# Patient Record
Sex: MALE | NOT HISPANIC OR LATINO | Employment: OTHER | URBAN - METROPOLITAN AREA
[De-identification: names, ages, dates, MRNs, and addresses within clinical notes are randomized per-mention and may not be internally consistent; named-entity substitution may affect disease eponyms.]

---

## 2020-09-22 ENCOUNTER — APPOINTMENT (OUTPATIENT)
Dept: RADIOLOGY | Facility: MEDICAL CENTER | Age: 70
DRG: 054 | End: 2020-09-22
Attending: INTERNAL MEDICINE
Payer: MEDICARE

## 2020-09-22 ENCOUNTER — HOSPITAL ENCOUNTER (INPATIENT)
Facility: MEDICAL CENTER | Age: 70
LOS: 3 days | DRG: 054 | End: 2020-09-25
Attending: INTERNAL MEDICINE | Admitting: INTERNAL MEDICINE
Payer: MEDICARE

## 2020-09-22 PROBLEM — R73.9 HYPERGLYCEMIA: Status: ACTIVE | Noted: 2020-09-22

## 2020-09-22 PROBLEM — C79.31 BRAIN METASTASES: Status: ACTIVE | Noted: 2020-09-22

## 2020-09-22 PROBLEM — E87.1 HYPONATREMIA: Status: ACTIVE | Noted: 2020-09-22

## 2020-09-22 PROBLEM — F10.10 ALCOHOL ABUSE: Status: ACTIVE | Noted: 2020-09-22

## 2020-09-22 PROBLEM — D72.829 LEUKOCYTOSIS: Status: ACTIVE | Noted: 2020-09-22

## 2020-09-22 PROBLEM — Z72.0 TOBACCO ABUSE: Status: ACTIVE | Noted: 2020-09-22

## 2020-09-22 LAB — COVID ORDER STATUS COVID19: NORMAL

## 2020-09-22 PROCEDURE — A9270 NON-COVERED ITEM OR SERVICE: HCPCS | Performed by: INTERNAL MEDICINE

## 2020-09-22 PROCEDURE — 700102 HCHG RX REV CODE 250 W/ 637 OVERRIDE(OP): Performed by: NEUROLOGICAL SURGERY

## 2020-09-22 PROCEDURE — 770006 HCHG ROOM/CARE - MED/SURG/GYN SEMI*

## 2020-09-22 PROCEDURE — 99407 BEHAV CHNG SMOKING > 10 MIN: CPT | Performed by: INTERNAL MEDICINE

## 2020-09-22 PROCEDURE — C9803 HOPD COVID-19 SPEC COLLECT: HCPCS | Performed by: INTERNAL MEDICINE

## 2020-09-22 PROCEDURE — U0003 INFECTIOUS AGENT DETECTION BY NUCLEIC ACID (DNA OR RNA); SEVERE ACUTE RESPIRATORY SYNDROME CORONAVIRUS 2 (SARS-COV-2) (CORONAVIRUS DISEASE [COVID-19]), AMPLIFIED PROBE TECHNIQUE, MAKING USE OF HIGH THROUGHPUT TECHNOLOGIES AS DESCRIBED BY CMS-2020-01-R: HCPCS

## 2020-09-22 PROCEDURE — A9270 NON-COVERED ITEM OR SERVICE: HCPCS | Performed by: NEUROLOGICAL SURGERY

## 2020-09-22 PROCEDURE — 700105 HCHG RX REV CODE 258: Performed by: INTERNAL MEDICINE

## 2020-09-22 PROCEDURE — 700102 HCHG RX REV CODE 250 W/ 637 OVERRIDE(OP): Performed by: INTERNAL MEDICINE

## 2020-09-22 PROCEDURE — 99223 1ST HOSP IP/OBS HIGH 75: CPT | Mod: 25 | Performed by: INTERNAL MEDICINE

## 2020-09-22 RX ORDER — OXYCODONE HYDROCHLORIDE 5 MG/1
10 TABLET ORAL
COMMUNITY

## 2020-09-22 RX ORDER — BISACODYL 10 MG
10 SUPPOSITORY, RECTAL RECTAL
Status: DISCONTINUED | OUTPATIENT
Start: 2020-09-22 | End: 2020-09-25 | Stop reason: HOSPADM

## 2020-09-22 RX ORDER — LISINOPRIL 10 MG/1
10 TABLET ORAL 2 TIMES DAILY
COMMUNITY

## 2020-09-22 RX ORDER — LEVETIRACETAM 500 MG/1
500 TABLET ORAL 2 TIMES DAILY
Status: DISCONTINUED | OUTPATIENT
Start: 2020-09-22 | End: 2020-09-25 | Stop reason: HOSPADM

## 2020-09-22 RX ORDER — ACETAMINOPHEN 325 MG/1
650 TABLET ORAL EVERY 6 HOURS PRN
Status: DISCONTINUED | OUTPATIENT
Start: 2020-09-22 | End: 2020-09-25 | Stop reason: HOSPADM

## 2020-09-22 RX ORDER — ONDANSETRON 4 MG/1
4 TABLET, ORALLY DISINTEGRATING ORAL EVERY 4 HOURS PRN
Status: DISCONTINUED | OUTPATIENT
Start: 2020-09-22 | End: 2020-09-25 | Stop reason: HOSPADM

## 2020-09-22 RX ORDER — SODIUM CHLORIDE 9 MG/ML
INJECTION, SOLUTION INTRAVENOUS CONTINUOUS
Status: DISCONTINUED | OUTPATIENT
Start: 2020-09-22 | End: 2020-09-25

## 2020-09-22 RX ORDER — OXYCODONE HYDROCHLORIDE 10 MG/1
10 TABLET ORAL
Status: DISCONTINUED | OUTPATIENT
Start: 2020-09-22 | End: 2020-09-25 | Stop reason: HOSPADM

## 2020-09-22 RX ORDER — DEXAMETHASONE 4 MG/1
4 TABLET ORAL EVERY 8 HOURS
Status: DISCONTINUED | OUTPATIENT
Start: 2020-09-22 | End: 2020-09-25 | Stop reason: HOSPADM

## 2020-09-22 RX ORDER — POLYETHYLENE GLYCOL 3350 17 G/17G
1 POWDER, FOR SOLUTION ORAL
Status: DISCONTINUED | OUTPATIENT
Start: 2020-09-22 | End: 2020-09-25 | Stop reason: HOSPADM

## 2020-09-22 RX ORDER — GABAPENTIN 300 MG/1
300 CAPSULE ORAL 4 TIMES DAILY
COMMUNITY

## 2020-09-22 RX ORDER — GABAPENTIN 300 MG/1
300 CAPSULE ORAL 4 TIMES DAILY
Status: DISCONTINUED | OUTPATIENT
Start: 2020-09-22 | End: 2020-09-25 | Stop reason: HOSPADM

## 2020-09-22 RX ORDER — ONDANSETRON 2 MG/ML
4 INJECTION INTRAMUSCULAR; INTRAVENOUS EVERY 4 HOURS PRN
Status: DISCONTINUED | OUTPATIENT
Start: 2020-09-22 | End: 2020-09-25 | Stop reason: HOSPADM

## 2020-09-22 RX ORDER — AMOXICILLIN 250 MG
2 CAPSULE ORAL 2 TIMES DAILY
Status: DISCONTINUED | OUTPATIENT
Start: 2020-09-22 | End: 2020-09-25 | Stop reason: HOSPADM

## 2020-09-22 RX ORDER — ENALAPRILAT 1.25 MG/ML
1.25 INJECTION INTRAVENOUS EVERY 6 HOURS PRN
Status: DISCONTINUED | OUTPATIENT
Start: 2020-09-22 | End: 2020-09-25 | Stop reason: HOSPADM

## 2020-09-22 RX ORDER — LISINOPRIL 10 MG/1
10 TABLET ORAL 2 TIMES DAILY
Status: DISCONTINUED | OUTPATIENT
Start: 2020-09-22 | End: 2020-09-25 | Stop reason: HOSPADM

## 2020-09-22 RX ADMIN — OXYCODONE HYDROCHLORIDE 10 MG: 10 TABLET ORAL at 21:11

## 2020-09-22 RX ADMIN — DEXAMETHASONE 4 MG: 4 TABLET ORAL at 22:18

## 2020-09-22 RX ADMIN — LEVETIRACETAM 500 MG: 500 TABLET ORAL at 21:11

## 2020-09-22 RX ADMIN — DOCUSATE SODIUM 50 MG AND SENNOSIDES 8.6 MG 2 TABLET: 8.6; 5 TABLET, FILM COATED ORAL at 21:12

## 2020-09-22 RX ADMIN — GABAPENTIN 300 MG: 300 CAPSULE ORAL at 21:12

## 2020-09-22 RX ADMIN — ACETAMINOPHEN 650 MG: 325 TABLET, FILM COATED ORAL at 21:11

## 2020-09-22 RX ADMIN — SODIUM CHLORIDE: 9 INJECTION, SOLUTION INTRAVENOUS at 21:14

## 2020-09-22 RX ADMIN — LISINOPRIL 10 MG: 10 TABLET ORAL at 21:11

## 2020-09-22 SDOH — HEALTH STABILITY: MENTAL HEALTH: HOW OFTEN DO YOU HAVE 6 OR MORE DRINKS ON ONE OCCASION?: LESS THAN MONTHLY

## 2020-09-22 SDOH — HEALTH STABILITY: MENTAL HEALTH: HOW MANY STANDARD DRINKS CONTAINING ALCOHOL DO YOU HAVE ON A TYPICAL DAY?: 3 OR 4

## 2020-09-22 SDOH — HEALTH STABILITY: MENTAL HEALTH: HOW OFTEN DO YOU HAVE A DRINK CONTAINING ALCOHOL?: 4 OR MORE TIMES A WEEK

## 2020-09-22 SDOH — ECONOMIC STABILITY: TRANSPORTATION INSECURITY
IN THE PAST 12 MONTHS, HAS THE LACK OF TRANSPORTATION KEPT YOU FROM MEDICAL APPOINTMENTS OR FROM GETTING MEDICATIONS?: PATIENT DECLINED

## 2020-09-22 SDOH — ECONOMIC STABILITY: FOOD INSECURITY: WITHIN THE PAST 12 MONTHS, THE FOOD YOU BOUGHT JUST DIDN'T LAST AND YOU DIDN'T HAVE MONEY TO GET MORE.: PATIENT DECLINED

## 2020-09-22 SDOH — ECONOMIC STABILITY: FOOD INSECURITY: WITHIN THE PAST 12 MONTHS, YOU WORRIED THAT YOUR FOOD WOULD RUN OUT BEFORE YOU GOT MONEY TO BUY MORE.: PATIENT DECLINED

## 2020-09-22 SDOH — ECONOMIC STABILITY: TRANSPORTATION INSECURITY
IN THE PAST 12 MONTHS, HAS LACK OF TRANSPORTATION KEPT YOU FROM MEETINGS, WORK, OR FROM GETTING THINGS NEEDED FOR DAILY LIVING?: PATIENT DECLINED

## 2020-09-22 ASSESSMENT — ENCOUNTER SYMPTOMS
SPUTUM PRODUCTION: 0
HEADACHES: 0
SPEECH CHANGE: 0
CHILLS: 0
BACK PAIN: 1
VOMITING: 0
SHORTNESS OF BREATH: 0
DEPRESSION: 0
LOSS OF CONSCIOUSNESS: 0
STRIDOR: 0
DIZZINESS: 0
ABDOMINAL PAIN: 0
TINGLING: 0
MYALGIAS: 0
FOCAL WEAKNESS: 1
NAUSEA: 0
PALPITATIONS: 0
FALLS: 0
CONSTIPATION: 0
MEMORY LOSS: 1
DIARRHEA: 0
SENSORY CHANGE: 0
COUGH: 0
WEAKNESS: 1
FEVER: 0

## 2020-09-22 ASSESSMENT — LIFESTYLE VARIABLES
HOW MANY TIMES IN THE PAST YEAR HAVE YOU HAD 5 OR MORE DRINKS IN A DAY: 0
TOTAL SCORE: 0
EVER HAD A DRINK FIRST THING IN THE MORNING TO STEADY YOUR NERVES TO GET RID OF A HANGOVER: NO
ON A TYPICAL DAY WHEN YOU DRINK ALCOHOL HOW MANY DRINKS DO YOU HAVE: 3
HAVE YOU EVER FELT YOU SHOULD CUT DOWN ON YOUR DRINKING: NO
CONSUMPTION TOTAL: POSITIVE
HAVE PEOPLE ANNOYED YOU BY CRITICIZING YOUR DRINKING: NO
AVERAGE NUMBER OF DAYS PER WEEK YOU HAVE A DRINK CONTAINING ALCOHOL: 5
EVER FELT BAD OR GUILTY ABOUT YOUR DRINKING: NO
TOTAL SCORE: 0
ALCOHOL_USE: YES
TOTAL SCORE: 0

## 2020-09-22 ASSESSMENT — COGNITIVE AND FUNCTIONAL STATUS - GENERAL
MOBILITY SCORE: 18
TURNING FROM BACK TO SIDE WHILE IN FLAT BAD: A LITTLE
TOILETING: A LITTLE
PERSONAL GROOMING: A LITTLE
SUGGESTED CMS G CODE MODIFIER DAILY ACTIVITY: CK
MOVING FROM LYING ON BACK TO SITTING ON SIDE OF FLAT BED: A LITTLE
CLIMB 3 TO 5 STEPS WITH RAILING: A LITTLE
SUGGESTED CMS G CODE MODIFIER MOBILITY: CK
HELP NEEDED FOR BATHING: A LITTLE
DRESSING REGULAR UPPER BODY CLOTHING: A LITTLE
MOVING TO AND FROM BED TO CHAIR: A LITTLE
DRESSING REGULAR LOWER BODY CLOTHING: A LITTLE
WALKING IN HOSPITAL ROOM: A LITTLE
STANDING UP FROM CHAIR USING ARMS: A LITTLE
EATING MEALS: A LITTLE
DAILY ACTIVITIY SCORE: 18

## 2020-09-22 ASSESSMENT — PATIENT HEALTH QUESTIONNAIRE - PHQ9
SUM OF ALL RESPONSES TO PHQ9 QUESTIONS 1 AND 2: 0
2. FEELING DOWN, DEPRESSED, IRRITABLE, OR HOPELESS: NOT AT ALL
1. LITTLE INTEREST OR PLEASURE IN DOING THINGS: NOT AT ALL

## 2020-09-22 ASSESSMENT — PAIN DESCRIPTION - PAIN TYPE: TYPE: ACUTE PAIN

## 2020-09-22 NOTE — PROGRESS NOTES
TRIAGE OFFICER DIRECT ADMIT ACCEPTANCE NOTE:    -I spoke with the transferring provider, Dr. Hatch of [facility].  -This is a 70 y.o. male with no past medical history except for tobacco dependence, who presented to the outlying facility with falls, confusion, and right-sided dysmetria.  Head imaging showed 3 large brain masses with edema, suspicious for brain mets.  No other imaging were done, but  film for the CT did suggest probable lung primary.  Patient was given 10 mg of IV Decadron.  Neurosurgery (Dr. Mariano) has been consulted, and has agreed to consult.  -Please call neurosurgery (Dr. Mariano) to inform them of patient's arrival.  -Please call admitting hospitalist ON-CALL for full H&P and admission orders, on patient's arrival to the unit.

## 2020-09-23 ENCOUNTER — APPOINTMENT (OUTPATIENT)
Dept: RADIOLOGY | Facility: MEDICAL CENTER | Age: 70
DRG: 054 | End: 2020-09-23
Attending: HOSPITALIST
Payer: MEDICARE

## 2020-09-23 ENCOUNTER — APPOINTMENT (OUTPATIENT)
Dept: RADIOLOGY | Facility: MEDICAL CENTER | Age: 70
DRG: 054 | End: 2020-09-23
Attending: INTERNAL MEDICINE
Payer: MEDICARE

## 2020-09-23 PROBLEM — R91.8 MASS OF LEFT LUNG: Status: ACTIVE | Noted: 2020-09-23

## 2020-09-23 LAB
ALBUMIN SERPL BCP-MCNC: 4 G/DL (ref 3.2–4.9)
ALBUMIN/GLOB SERPL: 1.6 G/DL
ALP SERPL-CCNC: 61 U/L (ref 30–99)
ALT SERPL-CCNC: 14 U/L (ref 2–50)
ANION GAP SERPL CALC-SCNC: 15 MMOL/L (ref 7–16)
ANION GAP SERPL CALC-SCNC: 16 MMOL/L (ref 7–16)
AST SERPL-CCNC: 13 U/L (ref 12–45)
BILIRUB SERPL-MCNC: 0.4 MG/DL (ref 0.1–1.5)
BUN SERPL-MCNC: 11 MG/DL (ref 8–22)
BUN SERPL-MCNC: 12 MG/DL (ref 8–22)
CALCIUM SERPL-MCNC: 8.6 MG/DL (ref 8.5–10.5)
CALCIUM SERPL-MCNC: 8.7 MG/DL (ref 8.5–10.5)
CHLORIDE SERPL-SCNC: 96 MMOL/L (ref 96–112)
CHLORIDE SERPL-SCNC: 97 MMOL/L (ref 96–112)
CO2 SERPL-SCNC: 20 MMOL/L (ref 20–33)
CO2 SERPL-SCNC: 20 MMOL/L (ref 20–33)
CREAT SERPL-MCNC: 0.39 MG/DL (ref 0.5–1.4)
CREAT SERPL-MCNC: 0.49 MG/DL (ref 0.5–1.4)
ERYTHROCYTE [DISTWIDTH] IN BLOOD BY AUTOMATED COUNT: 42.7 FL (ref 35.9–50)
GLOBULIN SER CALC-MCNC: 2.5 G/DL (ref 1.9–3.5)
GLUCOSE SERPL-MCNC: 122 MG/DL (ref 65–99)
GLUCOSE SERPL-MCNC: 223 MG/DL (ref 65–99)
HCT VFR BLD AUTO: 47.3 % (ref 42–52)
HGB BLD-MCNC: 16.5 G/DL (ref 14–18)
MCH RBC QN AUTO: 33.6 PG (ref 27–33)
MCHC RBC AUTO-ENTMCNC: 34.9 G/DL (ref 33.7–35.3)
MCV RBC AUTO: 96.3 FL (ref 81.4–97.8)
PLATELET # BLD AUTO: 273 K/UL (ref 164–446)
PMV BLD AUTO: 9.8 FL (ref 9–12.9)
POTASSIUM SERPL-SCNC: 4.2 MMOL/L (ref 3.6–5.5)
POTASSIUM SERPL-SCNC: 4.2 MMOL/L (ref 3.6–5.5)
PROT SERPL-MCNC: 6.5 G/DL (ref 6–8.2)
RBC # BLD AUTO: 4.91 M/UL (ref 4.7–6.1)
SARS-COV-2 RNA RESP QL NAA+PROBE: NOTDETECTED
SODIUM SERPL-SCNC: 131 MMOL/L (ref 135–145)
SODIUM SERPL-SCNC: 133 MMOL/L (ref 135–145)
SPECIMEN SOURCE: NORMAL
WBC # BLD AUTO: 13.4 K/UL (ref 4.8–10.8)

## 2020-09-23 PROCEDURE — 700102 HCHG RX REV CODE 250 W/ 637 OVERRIDE(OP): Performed by: NEUROLOGICAL SURGERY

## 2020-09-23 PROCEDURE — 36415 COLL VENOUS BLD VENIPUNCTURE: CPT

## 2020-09-23 PROCEDURE — A9270 NON-COVERED ITEM OR SERVICE: HCPCS | Performed by: NEUROLOGICAL SURGERY

## 2020-09-23 PROCEDURE — 700102 HCHG RX REV CODE 250 W/ 637 OVERRIDE(OP): Performed by: INTERNAL MEDICINE

## 2020-09-23 PROCEDURE — 80053 COMPREHEN METABOLIC PANEL: CPT

## 2020-09-23 PROCEDURE — A9270 NON-COVERED ITEM OR SERVICE: HCPCS | Performed by: INTERNAL MEDICINE

## 2020-09-23 PROCEDURE — 80048 BASIC METABOLIC PNL TOTAL CA: CPT

## 2020-09-23 PROCEDURE — 74177 CT ABD & PELVIS W/CONTRAST: CPT

## 2020-09-23 PROCEDURE — 74019 RADEX ABDOMEN 2 VIEWS: CPT

## 2020-09-23 PROCEDURE — 99233 SBSQ HOSP IP/OBS HIGH 50: CPT | Performed by: HOSPITALIST

## 2020-09-23 PROCEDURE — 770006 HCHG ROOM/CARE - MED/SURG/GYN SEMI*

## 2020-09-23 PROCEDURE — 700117 HCHG RX CONTRAST REV CODE 255: Performed by: INTERNAL MEDICINE

## 2020-09-23 PROCEDURE — 85027 COMPLETE CBC AUTOMATED: CPT

## 2020-09-23 PROCEDURE — 700105 HCHG RX REV CODE 258: Performed by: INTERNAL MEDICINE

## 2020-09-23 RX ORDER — LORAZEPAM 2 MG/ML
0.5 INJECTION INTRAMUSCULAR EVERY 4 HOURS PRN
Status: DISCONTINUED | OUTPATIENT
Start: 2020-09-23 | End: 2020-09-25 | Stop reason: HOSPADM

## 2020-09-23 RX ADMIN — OXYCODONE HYDROCHLORIDE 10 MG: 10 TABLET ORAL at 05:36

## 2020-09-23 RX ADMIN — LISINOPRIL 10 MG: 10 TABLET ORAL at 05:35

## 2020-09-23 RX ADMIN — DEXAMETHASONE 4 MG: 4 TABLET ORAL at 05:36

## 2020-09-23 RX ADMIN — DEXAMETHASONE 4 MG: 4 TABLET ORAL at 21:39

## 2020-09-23 RX ADMIN — LISINOPRIL 10 MG: 10 TABLET ORAL at 17:23

## 2020-09-23 RX ADMIN — GABAPENTIN 300 MG: 300 CAPSULE ORAL at 21:39

## 2020-09-23 RX ADMIN — GABAPENTIN 300 MG: 300 CAPSULE ORAL at 08:08

## 2020-09-23 RX ADMIN — SODIUM CHLORIDE: 9 INJECTION, SOLUTION INTRAVENOUS at 21:40

## 2020-09-23 RX ADMIN — LEVETIRACETAM 500 MG: 500 TABLET ORAL at 05:35

## 2020-09-23 RX ADMIN — ACETAMINOPHEN 650 MG: 325 TABLET, FILM COATED ORAL at 05:36

## 2020-09-23 RX ADMIN — OXYCODONE HYDROCHLORIDE 10 MG: 10 TABLET ORAL at 21:39

## 2020-09-23 RX ADMIN — DOCUSATE SODIUM 50 MG AND SENNOSIDES 8.6 MG 2 TABLET: 8.6; 5 TABLET, FILM COATED ORAL at 17:23

## 2020-09-23 RX ADMIN — GABAPENTIN 300 MG: 300 CAPSULE ORAL at 13:35

## 2020-09-23 RX ADMIN — GABAPENTIN 300 MG: 300 CAPSULE ORAL at 17:23

## 2020-09-23 RX ADMIN — LEVETIRACETAM 500 MG: 500 TABLET ORAL at 17:23

## 2020-09-23 RX ADMIN — IOHEXOL 100 ML: 350 INJECTION, SOLUTION INTRAVENOUS at 12:30

## 2020-09-23 RX ADMIN — DOCUSATE SODIUM 50 MG AND SENNOSIDES 8.6 MG 2 TABLET: 8.6; 5 TABLET, FILM COATED ORAL at 05:36

## 2020-09-23 RX ADMIN — DEXAMETHASONE 4 MG: 4 TABLET ORAL at 13:50

## 2020-09-23 ASSESSMENT — ENCOUNTER SYMPTOMS
SPEECH CHANGE: 0
DIARRHEA: 0
WEAKNESS: 1
FOCAL WEAKNESS: 1
ABDOMINAL PAIN: 0
HEADACHES: 0
DEPRESSION: 0
DIZZINESS: 0
NAUSEA: 0
HEMOPTYSIS: 0
PALPITATIONS: 0
SPUTUM PRODUCTION: 0
BRUISES/BLEEDS EASILY: 0
WHEEZING: 0
SENSORY CHANGE: 0
DIAPHORESIS: 0
COUGH: 0
EYE DISCHARGE: 0
NECK PAIN: 0
EYE PAIN: 0
CHILLS: 0
BACK PAIN: 0
VOMITING: 0
FEVER: 0
CLAUDICATION: 0
LOSS OF CONSCIOUSNESS: 0
CONSTIPATION: 0
SHORTNESS OF BREATH: 0
MYALGIAS: 0
SORE THROAT: 0

## 2020-09-23 ASSESSMENT — PAIN DESCRIPTION - PAIN TYPE
TYPE: ACUTE PAIN
TYPE: CHRONIC PAIN

## 2020-09-23 ASSESSMENT — LIFESTYLE VARIABLES: SUBSTANCE_ABUSE: 0

## 2020-09-23 NOTE — ASSESSMENT & PLAN NOTE
-Trending down, now near normal.  No need for coverage at this time.  We will continue to monitor.

## 2020-09-23 NOTE — ASSESSMENT & PLAN NOTE
-Noted on imaging from outside facility.  Likely metastatic disease.  -Continue Decadron and Keppra per neurosurgery, who is following.  -Pending stealth MRI.  - CT chest/abdomen/pelvis with contrast with 5.5 cm JIMMIE lung mass, likely primary.  Lung biopsy pending.

## 2020-09-23 NOTE — PROGRESS NOTES
0700: Assumed care of pt after report. Pt resting in bed, no concerns voiced. Call light with in reach.

## 2020-09-23 NOTE — PROGRESS NOTES
1900: Patient arrived to unit escorted by EMS via gurney without incident. Patient able to ambulate to bed with stand by assist upon arrival. Patient oriented to room and how to use call light to alert staff of needs. Admitting notified of patient's arrival.      1930: Patient armband placed and consent to treat signed. Dr. Clifton at bedside to see patient.

## 2020-09-23 NOTE — CONSULTS
DATE OF SERVICE:  09/23/2020    NEUROSURGERY CONSULTATION     REASON FOR CONSULTATION:  Multifocal brain tumors per report.      HISTORY OF PRESENT ILLNESS:  This is a 70-year-old gentleman with a history of   1.5 pack smoking per day for the last 40 years, who presented with right   lower extremity weakness for concern for stroke.  He subsequently had a brain   MRI at [facility], which demonstrated per the ED report,   multifocal brain tumors.  Unfortunately, that images were not pushed by the   time the consultation was performed nor that the patient have any imaging.    The patient's symptoms have significantly resolved by this morning on Decadron   and Keppra.  It is unclear whether or not this is going to be secondary to   improvement in vasogenic edema or if the patient actually had a seizure and   had a Min's paralysis ____ continued to improve.  He still has a foot drop   this morning on the right side and the patient appears to be otherwise   neurologically intact this morning.      REVIEW OF SYSTEMS:  A 12-point review of systems as per HPI.  He denies any   chest pain, shortness of breath, bowel or bladder incontinence.  He does not   have any loss of consciousness.      PAST MEDICAL HISTORY:  Noncontributory.      PAST SURGICAL HISTORY:  Noncontributory.      MEDICATIONS:  He is not on any blood thinners per report.      PHYSICAL EXAMINATION:    GENERAL:  He is alert and oriented x3, able to answer questions appropriately.    No signs of dysarthria or aphasia.    HEENT:  Atraumatic, normocephalic.    PULMONARY:  Normal work of breathing.    CARDIOVASCULAR:  2+ pulses.    NEUROLOGIC:  Cranial nerves grossly intact.  Motor examination is nonfocal   with exception of a right lower extremity EHL weakness at 3/5.  He does not   have any paresthesias.      IMAGING:  None available, but per report, the patient has multifocal brain   lesions and his concern, although the patient did not have a  chest, abdomen,   and pelvis for potential lung cancer  per report based on social history.      ASSESSMENT AND PLAN:  At this time, the patient has a report of multifocal   brain lesions; however, we do not have any imaging.  We have ordered an MRI   brain with and without contrast with Stealth protocol.  The hospitalist has   ordered a chest, abdomen, and pelvis, which is supposed to be completed as of   10 a.m. this morning for surveillance and we have also recommended Keppra 500   b.i.d. and dexamethasone 4 mg oral q. 8 hours for now and we will await the   final imaging prior to leaving recommendations.  If a lesion is found on the   chest, abdomen and pelvis, we would endorse getting a biopsy of this for   diagnoses and pending what the MRI brain shows the patient may or may not need   a surgical intervention depending if there is a dominant lesion causing   symptoms versus potential benefits from whole brain radiation if there is   significant numbers of intracranial mets.  We will continue to add information   as the results come forward and we will continue to follow while patient is   continued to have workup.      A total of 50 minutes was spent in direct patient care, coordination and   consultation.       ____________________________________     MD MIO Miller / MARVA    DD:  09/23/2020 09:23:02  DT:  09/23/2020 12:03:57    D#:  [id number]  Job#:  [id number]

## 2020-09-23 NOTE — H&P
Hospital Medicine History & Physical Note    Date of Service  9/22/2020    Primary Care Physician  None     Consultants  Neurosurgery - Dr Mariano    Code Status  Full Code    Chief Complaint  Right leg weakness      History of Presenting Illness  70 y.o. male who presented 9/22/2020 with leg weakness, memory issues and confusion. He states his symptoms started about 1 week ago and have progressed. He denies falls but states he has been very off balance. He states both legs are weak, right > left. He states a couple nights ago he was unable to figure out how to get a wrench onto a bolt. He can't come up with the word he wants occasionally during the exam. He initially presented to [facility] where they did a CT head and noted multiple metastatic lesions, transferred here for higher level of care. I have discussed the case with the neurosurgeon. He also complains of chronic lower back pain for which he has a stimulator.    Review of Systems  Review of Systems   Constitutional: Negative for chills, fever and malaise/fatigue.   HENT: Negative for congestion.    Respiratory: Negative for cough, sputum production, shortness of breath and stridor.    Cardiovascular: Negative for chest pain, palpitations and leg swelling.   Gastrointestinal: Negative for abdominal pain, constipation, diarrhea, nausea and vomiting.   Genitourinary: Negative for dysuria and urgency.   Musculoskeletal: Positive for back pain. Negative for falls and myalgias.   Neurological: Positive for focal weakness and weakness. Negative for dizziness, tingling, sensory change, speech change, loss of consciousness and headaches.   Psychiatric/Behavioral: Positive for memory loss (and confusion ). Negative for depression and suicidal ideas.   All other systems reviewed and are negative.      Past Medical History  Chronic pain, hypertension    Surgical History   spinal stimulator     Family History  Reviewed, non pertinent      Social History   reports that  he has been smoking. He has been smoking about 1.50 packs per day. He has never used smokeless tobacco. He reports current alcohol use. He reports that he does not use drugs.    Allergies  No Known Allergies    Medications  Prior to Admission Medications   Prescriptions Last Dose Informant Patient Reported? Taking?   gabapentin (NEURONTIN) 300 MG Cap 9/22/2020 at 0800  Yes Yes   Sig: Take 300 mg by mouth 4 times a day.   lisinopril (PRINIVIL) 10 MG Tab 9/22/2020 at 0800  Yes Yes   Sig: Take 10 mg by mouth 2 times a day.   oxyCODONE immediate-release (ROXICODONE) 5 MG Tab 9/22/2020 at 0800  Yes Yes   Sig: Take 10 mg by mouth every 3 hours as needed for Severe Pain.      Facility-Administered Medications: None       Physical Exam       Physical Exam  Vitals signs and nursing note reviewed.   Constitutional:       General: He is not in acute distress.     Appearance: He is well-developed. He is not toxic-appearing or diaphoretic.   HENT:      Head: Normocephalic and atraumatic.      Right Ear: External ear normal.      Left Ear: External ear normal.      Nose: Nose normal. No congestion or rhinorrhea.      Mouth/Throat:      Mouth: Mucous membranes are dry.      Pharynx: No oropharyngeal exudate.   Eyes:      General:         Right eye: No discharge.         Left eye: No discharge.      Extraocular Movements: Extraocular movements intact.   Neck:      Musculoskeletal: Normal range of motion and neck supple. No edema or erythema.      Trachea: No tracheal deviation.   Cardiovascular:      Rate and Rhythm: Normal rate and regular rhythm.      Heart sounds: No murmur. No friction rub. No gallop.    Pulmonary:      Effort: Pulmonary effort is normal. No respiratory distress.      Breath sounds: Normal breath sounds. No stridor. No wheezing or rales.   Chest:      Chest wall: No tenderness.   Abdominal:      General: Bowel sounds are normal. There is no distension.      Palpations: Abdomen is soft.      Tenderness: There is  no abdominal tenderness.   Musculoskeletal:      Right lower leg: No edema.      Left lower leg: No edema.   Lymphadenopathy:      Cervical: No cervical adenopathy.   Skin:     General: Skin is warm and dry.      Coloration: Skin is not jaundiced.      Findings: No erythema or rash.   Neurological:      Mental Status: He is alert and oriented to person, place, and time.      Cranial Nerves: No cranial nerve deficit.      Motor: Weakness (right lower extremity ) present.      Comments: Some confusion and word finding difficulty    Psychiatric:         Behavior: Behavior normal.         Thought Content: Thought content normal.         Cognition and Memory: Cognition is impaired. Memory is impaired.         Judgment: Judgment normal.         Laboratory:    labs from outside facility: wbc 14, hgb 16.7, hct 48.2, platelets 246, Na 133, k 3.9, chloride 102, CO2 22, BUN 9, creatinine 0.77, glucose 123      No results for input(s): ALTSGPT, ASTSGOT, ALKPHOSPHAT, TBILIRUBIN, DBILIRUBIN, GAMMAGT, AMYLASE, LIPASE, ALB, PREALBUMIN, GLUCOSE in the last 72 hours.      No results for input(s): NTPROBNP in the last 72 hours.      No results for input(s): TROPONINT in the last 72 hours.    Imaging:  MR-BRAIN-WITH & W/O    (Results Pending)   MR-STEALTH BRAIN WITH & W/O    (Results Pending)   CT-CHEST,ABDOMEN,PELVIS WITH    (Results Pending)   outside facility CT head: wide areas of vasogenic edema within the posterior cerebral hemispheres more pronounced on the left related to round intra-axial masses suspicious for metastatic disease, 1 within the left paramedian anterior parietal lobe, another within the right lateral occipital pole, 3rd smaller lesion near the vertex within the left paramedian mid frontal lobe.      Assessment/Plan:  I anticipate this patient will require at least two midnights for appropriate medical management, necessitating inpatient admission.    * Brain metastases (HCC)- (present on admission)  Assessment &  Plan  -noted on CT head from outside facility  -likely metastatic disease  -I did discuss the case with Dr Mariano who is ordering MRI brain, dexamethasone and keppra  -I am ordering CT chest/abdomen/pelvis with contrast to determine primary    Hyponatremia- (present on admission)  Assessment & Plan  -mild, due to dehydration  -start IVF  -repeat bmp in am    Hyperglycemia- (present on admission)  Assessment & Plan  -mild, no need for coverage    Leukocytosis- (present on admission)  Assessment & Plan  -likely reactive  -no antibiotics needed  -repeat cbc in am    Alcohol abuse- (present on admission)  Assessment & Plan  -3 glasses of wine per day  -he denies history of withdrawal  -monitor for withdrawal, CIWA if needed    Tobacco abuse- (present on admission)  Assessment & Plan  -Tobacco cessation counseling and education provided for more than 11 minutes. Nicotine replacement options provided including patch, and further medical treatments including Wellbutrin and chantix.  As well as over the counter options of lozenges and gum  -he denied the need for a patch

## 2020-09-23 NOTE — CARE PLAN
Problem: Safety  Goal: Will remain free from injury  Outcome: PROGRESSING AS EXPECTED     Problem: Infection  Goal: Will remain free from infection  Outcome: PROGRESSING AS EXPECTED     Problem: Knowledge Deficit  Goal: Knowledge of disease process/condition, treatment plan, diagnostic tests, and medications will improve  Outcome: PROGRESSING AS EXPECTED  Intervention: Assess knowledge level of disease process/condition, treatment plan, diagnostic tests, and medications  Note: Assess knowledge and educate pt on all treatments and procedures     Problem: Pain Management  Goal: Pain level will decrease to patient's comfort goal  Outcome: PROGRESSING AS EXPECTED  Intervention: Follow pain managment plan developed in collaboration with patient and Interdisciplinary Team  Note: Monitor pain, medicate per MAR to keep pain at pts tolerable level

## 2020-09-23 NOTE — PROGRESS NOTES
Two RN skin check complete with Ginny RN  Devices in place None  Skin Assessed under devices N/A    No areas of skin breakdown noted.

## 2020-09-23 NOTE — ASSESSMENT & PLAN NOTE
-Likely secondary to steroids.  No clinical evidence of infection at this time but will continue to monitor closely.  -Repeat CBC in a.m.

## 2020-09-23 NOTE — CARE PLAN
Problem: Safety  Goal: Will remain free from falls  Outcome: PROGRESSING AS EXPECTED   Fall precautions in place. Patient oriented to room and how to use call light to alert staff of needs. Encouraged patient to use call light to alert staff of needs and before getting out of bed, patient verbalized understanding. Call light and personal belongings within reach. Hourly rounding in place.     Problem: Pain Management  Goal: Pain level will decrease to patient's comfort goal  Outcome: PROGRESSING AS EXPECTED   Discussed interventions available to manage pain and 0-10 pain rating scale. Patient medicated for pain per MAR.

## 2020-09-23 NOTE — ASSESSMENT & PLAN NOTE
-CT with 5.5 cm JIMMIE and 2 cm JIMMIE masses seen, likely primary for the brain metastases.  Pending IR biopsy today.  -Mediastinal adenopathy also noted.

## 2020-09-23 NOTE — PROGRESS NOTES
Hospital Medicine Daily Progress Note    Date of Service  9/23/2020    Chief Complaint  70 y.o. male admitted 9/22/2020 with right leg weakness, memory loss and confusion.    Hospital Course    This 69 yo male with chronic back pain, pain stimulator, found to have CT and MRI at [facility] with brain lesions.  CT chest/abdomen/pelvis with 5.5 cm JIMMIE mass appears to be primary, JIMMIE 2cm lung mass and mediastinal LN enlargements.     9/22:  Ordered IR lung biopsy with coag studies in a.m. NPO at [facility].  Per Dr. Mariano, get stealth MRIs since he will need radiation treatment of brain lesions.  Patient was told of this plan.  PT/OT ordered for right foot drop/weakness.  Continue decadron and keppra which have helped with his confusion.  Ativan prn MRI.   *        Consultants/Specialty  Dr. Mariano    Code Status  Full Code    Disposition  PT/OT evals ordered.  Right foot drop noted.    Review of Systems  Review of Systems   Constitutional: Negative for chills, diaphoresis, fever and malaise/fatigue.   HENT: Negative for congestion and sore throat.    Eyes: Negative for pain and discharge.   Respiratory: Negative for cough, hemoptysis, sputum production, shortness of breath and wheezing.    Cardiovascular: Negative for chest pain, palpitations, claudication and leg swelling.   Gastrointestinal: Negative for abdominal pain, constipation, diarrhea, melena, nausea and vomiting.   Genitourinary: Negative for dysuria, frequency and urgency.   Musculoskeletal: Negative for back pain, joint pain, myalgias and neck pain.   Skin: Negative for itching and rash.   Neurological: Positive for focal weakness and weakness. Negative for dizziness, sensory change, speech change, loss of consciousness and headaches.   Endo/Heme/Allergies: Does not bruise/bleed easily.   Psychiatric/Behavioral: Negative for depression, substance abuse and suicidal ideas.        Physical Exam  Temp:  [36 °C (96.8 °F)-37.1 °C (98.7 °F)] 36.2 °C (97.1  °F)  Pulse:  [65-91] 75  Resp:  [17] 17  BP: (113-153)/(73-87) 137/83  SpO2:  [92 %-97 %] 94 %    Physical Exam  Constitutional:       General: He is not in acute distress.     Appearance: He is not diaphoretic.   HENT:      Head: Normocephalic and atraumatic.      Mouth/Throat:      Pharynx: No oropharyngeal exudate.   Eyes:      General: No scleral icterus.        Right eye: No discharge.         Left eye: No discharge.      Conjunctiva/sclera: Conjunctivae normal.      Pupils: Pupils are equal, round, and reactive to light.   Neck:      Musculoskeletal: Normal range of motion and neck supple.      Thyroid: No thyromegaly.      Vascular: No JVD.      Trachea: No tracheal deviation.   Cardiovascular:      Rate and Rhythm: Normal rate and regular rhythm.      Heart sounds: Normal heart sounds. No murmur. No friction rub. No gallop.    Pulmonary:      Effort: Pulmonary effort is normal. No respiratory distress.      Breath sounds: Normal breath sounds. No wheezing or rales.   Chest:      Chest wall: No tenderness.   Abdominal:      General: Bowel sounds are normal. There is no distension.      Palpations: Abdomen is soft. There is no mass.      Tenderness: There is no abdominal tenderness. There is no guarding or rebound.   Musculoskeletal: Normal range of motion.         General: No tenderness.   Lymphadenopathy:      Cervical: No cervical adenopathy.   Skin:     General: Skin is warm and dry.      Findings: No erythema or rash.   Neurological:      Mental Status: He is alert and oriented to person, place, and time.      Cranial Nerves: No cranial nerve deficit.      Motor: No abnormal muscle tone.      Comments: Mild increased weakness of right foot dorsiflexion compared to left.   Psychiatric:         Behavior: Behavior normal.         Thought Content: Thought content normal.         Judgment: Judgment normal.         Fluids    Intake/Output Summary (Last 24 hours) at 9/23/2020 1633  Last data filed at 9/23/2020  1200  Gross per 24 hour   Intake 240 ml   Output --   Net 240 ml       Laboratory  Recent Labs     09/23/20  0719   WBC 13.4*   RBC 4.91   HEMOGLOBIN 16.5   HEMATOCRIT 47.3   MCV 96.3   MCH 33.6*   MCHC 34.9   RDW 42.7   PLATELETCT 273   MPV 9.8     Recent Labs     09/23/20  0719 09/23/20  1016   SODIUM 133* 131*   POTASSIUM 4.2 4.2   CHLORIDE 97 96   CO2 20 20   GLUCOSE 122* 223*   BUN 11 12   CREATININE 0.39* 0.49*   CALCIUM 8.7 8.6                   Imaging  CT-CHEST,ABDOMEN,PELVIS WITH   Final Result         1. A 5.5 cm cavitary mass in the left upper lobe, highly suggestive of primary malignancy.   2. A 2 cm nodule in the right upper lobe, most likely metastatic. Tiny 5 mm subpleural nodule in the right upper lobe may be benign and can be followed.   3. Borderline enlarged left hilar and supra aortic mediastinal nodes, which may be metastatic.   4. No suspicious lesions below the diaphragm.   5. Nonobstructing left nephrolithiasis.   6. Colonic diverticulosis.      MR-BRAIN-WITH & W/O    (Results Pending)   MR-STEALTH BRAIN WITH & W/O    (Results Pending)   HN-AAOASET-8 VIEWS    (Results Pending)        Assessment/Plan  * Brain metastases (HCC)- (present on admission)  Assessment & Plan  -noted on CT head from outside facility  -likely metastatic disease  -Dr Mariano consulted for [employer] [facility] MRI brain for eventual XRT treatment of brain lesions, dexamethasone and keppra   CT chest/abdomen/pelvis with contrast with 5.5 cm JIMMIE lung mass likely primary.  IR biopsy ordered.    Mass of left lung  Assessment & Plan  CT with 5.5 cm JIMMIE and 2 cm JIMMIE masses seen likely primary for the brain metastases.  Mediastinal adenopathy also noted.    Hyponatremia- (present on admission)  Assessment & Plan  -mild, due to dehydration  -start IVF  -repeat bmp in am    Hyperglycemia- (present on admission)  Assessment & Plan  -mild, no need for coverage    Leukocytosis- (present on admission)  Assessment & Plan  -likely reactive  -no  antibiotics needed  -repeat cbc in am    Alcohol abuse- (present on admission)  Assessment & Plan  -3 glasses of wine per day  -he denies history of withdrawal  -monitor for withdrawal, CIWA if needed    Tobacco abuse- (present on admission)  Assessment & Plan  -Tobacco cessation counseling and education provided for more than 11 minutes. Nicotine replacement options provided including patch, and further medical treatments including Wellbutrin and chantix.  As well as over the counter options of lozenges and gum  -he denied the need for a patch       VTE prophylaxis: scds

## 2020-09-24 ENCOUNTER — APPOINTMENT (OUTPATIENT)
Dept: RADIOLOGY | Facility: MEDICAL CENTER | Age: 70
DRG: 054 | End: 2020-09-24
Attending: NEUROLOGICAL SURGERY
Payer: MEDICARE

## 2020-09-24 ENCOUNTER — APPOINTMENT (OUTPATIENT)
Dept: RADIOLOGY | Facility: MEDICAL CENTER | Age: 70
DRG: 054 | End: 2020-09-24
Attending: RADIOLOGY
Payer: MEDICARE

## 2020-09-24 ENCOUNTER — APPOINTMENT (OUTPATIENT)
Dept: RADIOLOGY | Facility: MEDICAL CENTER | Age: 70
DRG: 054 | End: 2020-09-24
Attending: HOSPITALIST
Payer: MEDICARE

## 2020-09-24 LAB
ANION GAP SERPL CALC-SCNC: 11 MMOL/L (ref 7–16)
APTT PPP: 32.8 SEC (ref 24.7–36)
BASOPHILS # BLD AUTO: 0.1 % (ref 0–1.8)
BASOPHILS # BLD: 0.02 K/UL (ref 0–0.12)
BUN SERPL-MCNC: 11 MG/DL (ref 8–22)
CALCIUM SERPL-MCNC: 8.6 MG/DL (ref 8.5–10.5)
CHLORIDE SERPL-SCNC: 100 MMOL/L (ref 96–112)
CO2 SERPL-SCNC: 21 MMOL/L (ref 20–33)
CREAT SERPL-MCNC: 0.43 MG/DL (ref 0.5–1.4)
EOSINOPHIL # BLD AUTO: 0.02 K/UL (ref 0–0.51)
EOSINOPHIL NFR BLD: 0.1 % (ref 0–6.9)
ERYTHROCYTE [DISTWIDTH] IN BLOOD BY AUTOMATED COUNT: 42.7 FL (ref 35.9–50)
GLUCOSE SERPL-MCNC: 113 MG/DL (ref 65–99)
HCT VFR BLD AUTO: 46.4 % (ref 42–52)
HGB BLD-MCNC: 16.3 G/DL (ref 14–18)
IMM GRANULOCYTES # BLD AUTO: 0.19 K/UL (ref 0–0.11)
IMM GRANULOCYTES NFR BLD AUTO: 1.2 % (ref 0–0.9)
INR PPP: 0.99 (ref 0.87–1.13)
LYMPHOCYTES # BLD AUTO: 1.94 K/UL (ref 1–4.8)
LYMPHOCYTES NFR BLD: 12.6 % (ref 22–41)
MCH RBC QN AUTO: 33.7 PG (ref 27–33)
MCHC RBC AUTO-ENTMCNC: 35.1 G/DL (ref 33.7–35.3)
MCV RBC AUTO: 95.9 FL (ref 81.4–97.8)
MONOCYTES # BLD AUTO: 0.81 K/UL (ref 0–0.85)
MONOCYTES NFR BLD AUTO: 5.3 % (ref 0–13.4)
NEUTROPHILS # BLD AUTO: 12.38 K/UL (ref 1.82–7.42)
NEUTROPHILS NFR BLD: 80.7 % (ref 44–72)
NRBC # BLD AUTO: 0 K/UL
NRBC BLD-RTO: 0 /100 WBC
PLATELET # BLD AUTO: 270 K/UL (ref 164–446)
PMV BLD AUTO: 9.5 FL (ref 9–12.9)
POTASSIUM SERPL-SCNC: 4.2 MMOL/L (ref 3.6–5.5)
PROTHROMBIN TIME: 13.4 SEC (ref 12–14.6)
RBC # BLD AUTO: 4.84 M/UL (ref 4.7–6.1)
SODIUM SERPL-SCNC: 132 MMOL/L (ref 135–145)
WBC # BLD AUTO: 15.4 K/UL (ref 4.8–10.8)

## 2020-09-24 PROCEDURE — 80048 BASIC METABOLIC PNL TOTAL CA: CPT

## 2020-09-24 PROCEDURE — 71045 X-RAY EXAM CHEST 1 VIEW: CPT

## 2020-09-24 PROCEDURE — 99232 SBSQ HOSP IP/OBS MODERATE 35: CPT | Performed by: HOSPITALIST

## 2020-09-24 PROCEDURE — 770006 HCHG ROOM/CARE - MED/SURG/GYN SEMI*

## 2020-09-24 PROCEDURE — 88342 IMHCHEM/IMCYTCHM 1ST ANTB: CPT

## 2020-09-24 PROCEDURE — 87206 SMEAR FLUORESCENT/ACID STAI: CPT

## 2020-09-24 PROCEDURE — 88341 IMHCHEM/IMCYTCHM EA ADD ANTB: CPT | Mod: 91

## 2020-09-24 PROCEDURE — 36415 COLL VENOUS BLD VENIPUNCTURE: CPT

## 2020-09-24 PROCEDURE — 700111 HCHG RX REV CODE 636 W/ 250 OVERRIDE (IP)

## 2020-09-24 PROCEDURE — A9270 NON-COVERED ITEM OR SERVICE: HCPCS | Performed by: NEUROLOGICAL SURGERY

## 2020-09-24 PROCEDURE — 87102 FUNGUS ISOLATION CULTURE: CPT

## 2020-09-24 PROCEDURE — 32405 CT-BIOPSY-LUNG/MEDIASTINUM: CPT | Mod: LT

## 2020-09-24 PROCEDURE — 88305 TISSUE EXAM BY PATHOLOGIST: CPT

## 2020-09-24 PROCEDURE — 85610 PROTHROMBIN TIME: CPT

## 2020-09-24 PROCEDURE — 87015 SPECIMEN INFECT AGNT CONCNTJ: CPT

## 2020-09-24 PROCEDURE — 700102 HCHG RX REV CODE 250 W/ 637 OVERRIDE(OP): Performed by: INTERNAL MEDICINE

## 2020-09-24 PROCEDURE — 87205 SMEAR GRAM STAIN: CPT

## 2020-09-24 PROCEDURE — 700117 HCHG RX CONTRAST REV CODE 255: Performed by: NEUROLOGICAL SURGERY

## 2020-09-24 PROCEDURE — A9270 NON-COVERED ITEM OR SERVICE: HCPCS | Performed by: INTERNAL MEDICINE

## 2020-09-24 PROCEDURE — 87116 MYCOBACTERIA CULTURE: CPT

## 2020-09-24 PROCEDURE — 85025 COMPLETE CBC W/AUTO DIFF WBC: CPT

## 2020-09-24 PROCEDURE — 85730 THROMBOPLASTIN TIME PARTIAL: CPT

## 2020-09-24 PROCEDURE — A9576 INJ PROHANCE MULTIPACK: HCPCS | Performed by: NEUROLOGICAL SURGERY

## 2020-09-24 PROCEDURE — 700105 HCHG RX REV CODE 258: Performed by: INTERNAL MEDICINE

## 2020-09-24 PROCEDURE — 87070 CULTURE OTHR SPECIMN AEROBIC: CPT

## 2020-09-24 PROCEDURE — 700102 HCHG RX REV CODE 250 W/ 637 OVERRIDE(OP): Performed by: NEUROLOGICAL SURGERY

## 2020-09-24 PROCEDURE — 70553 MRI BRAIN STEM W/O & W/DYE: CPT

## 2020-09-24 PROCEDURE — 0BBG3ZX EXCISION OF LEFT UPPER LUNG LOBE, PERCUTANEOUS APPROACH, DIAGNOSTIC: ICD-10-PCS | Performed by: RADIOLOGY

## 2020-09-24 RX ORDER — MIDAZOLAM HYDROCHLORIDE 1 MG/ML
.5-2 INJECTION INTRAMUSCULAR; INTRAVENOUS PRN
Status: ACTIVE | OUTPATIENT
Start: 2020-09-24 | End: 2020-09-24

## 2020-09-24 RX ORDER — MIDAZOLAM HYDROCHLORIDE 1 MG/ML
INJECTION INTRAMUSCULAR; INTRAVENOUS
Status: COMPLETED
Start: 2020-09-24 | End: 2020-09-24

## 2020-09-24 RX ORDER — NALOXONE HYDROCHLORIDE 0.4 MG/ML
INJECTION, SOLUTION INTRAMUSCULAR; INTRAVENOUS; SUBCUTANEOUS
Status: COMPLETED
Start: 2020-09-24 | End: 2020-09-24

## 2020-09-24 RX ORDER — ONDANSETRON 2 MG/ML
4 INJECTION INTRAMUSCULAR; INTRAVENOUS PRN
Status: ACTIVE | OUTPATIENT
Start: 2020-09-24 | End: 2020-09-24

## 2020-09-24 RX ORDER — SODIUM CHLORIDE 9 MG/ML
500 INJECTION, SOLUTION INTRAVENOUS
Status: ACTIVE | OUTPATIENT
Start: 2020-09-24 | End: 2020-09-24

## 2020-09-24 RX ADMIN — OXYCODONE HYDROCHLORIDE 10 MG: 10 TABLET ORAL at 20:10

## 2020-09-24 RX ADMIN — MIDAZOLAM HYDROCHLORIDE 1 MG: 1 INJECTION, SOLUTION INTRAMUSCULAR; INTRAVENOUS at 16:45

## 2020-09-24 RX ADMIN — LISINOPRIL 10 MG: 10 TABLET ORAL at 17:50

## 2020-09-24 RX ADMIN — SODIUM CHLORIDE: 9 INJECTION, SOLUTION INTRAVENOUS at 12:29

## 2020-09-24 RX ADMIN — LEVETIRACETAM 500 MG: 500 TABLET ORAL at 17:50

## 2020-09-24 RX ADMIN — GABAPENTIN 300 MG: 300 CAPSULE ORAL at 12:29

## 2020-09-24 RX ADMIN — FENTANYL CITRATE 25 MCG: 50 INJECTION INTRAMUSCULAR; INTRAVENOUS at 16:45

## 2020-09-24 RX ADMIN — DOCUSATE SODIUM 50 MG AND SENNOSIDES 8.6 MG 2 TABLET: 8.6; 5 TABLET, FILM COATED ORAL at 04:53

## 2020-09-24 RX ADMIN — GABAPENTIN 300 MG: 300 CAPSULE ORAL at 20:10

## 2020-09-24 RX ADMIN — DEXAMETHASONE 4 MG: 4 TABLET ORAL at 04:53

## 2020-09-24 RX ADMIN — GABAPENTIN 300 MG: 300 CAPSULE ORAL at 09:16

## 2020-09-24 RX ADMIN — LEVETIRACETAM 500 MG: 500 TABLET ORAL at 04:53

## 2020-09-24 RX ADMIN — MIDAZOLAM HYDROCHLORIDE 1 MG: 1 INJECTION INTRAMUSCULAR; INTRAVENOUS at 16:45

## 2020-09-24 RX ADMIN — DEXAMETHASONE 4 MG: 4 TABLET ORAL at 20:16

## 2020-09-24 RX ADMIN — GADOTERIDOL 18 ML: 279.3 INJECTION, SOLUTION INTRAVENOUS at 11:33

## 2020-09-24 RX ADMIN — OXYCODONE HYDROCHLORIDE 10 MG: 10 TABLET ORAL at 03:16

## 2020-09-24 RX ADMIN — DEXAMETHASONE 4 MG: 4 TABLET ORAL at 15:21

## 2020-09-24 ASSESSMENT — PAIN DESCRIPTION - PAIN TYPE
TYPE: CHRONIC PAIN

## 2020-09-24 ASSESSMENT — ENCOUNTER SYMPTOMS
DIZZINESS: 0
FEVER: 0
INSOMNIA: 0
VOMITING: 0
SENSORY CHANGE: 0
SPEECH CHANGE: 0
WEAKNESS: 1
CONSTIPATION: 0
NERVOUS/ANXIOUS: 0
ABDOMINAL PAIN: 0
SHORTNESS OF BREATH: 0
DIARRHEA: 0
DEPRESSION: 0
COUGH: 0
LOSS OF CONSCIOUSNESS: 0
WHEEZING: 0
HEADACHES: 0
PALPITATIONS: 0
CHILLS: 0
FOCAL WEAKNESS: 1
NAUSEA: 0

## 2020-09-24 NOTE — PROGRESS NOTES
CT Nursing Note:    Patient consented for Left Lung Biopsy with imaging guidance by Dr. Garrison, all questions answered. Patient sedated at Dr. Garrison's direction, see MAR. The pt appeared to tolerate the procedure well.   Gauze and tegaderm applied to left upper chest, CDI and soft.  Pt alert and verbally appropriate post procedure, vital signs stable during procedure and transport, see flow sheet for vital signs.  Report given to KIM Phan.  RN transported pt to [facility] with Sao2 monitor.      Biosentry Tract Sealant deployed in the left lung.

## 2020-09-24 NOTE — PROGRESS NOTES
Mri stealth bravo sequence unable to be scanned due to patients spinal stimulator scan limitations/alteranate coil per manufactures parameters

## 2020-09-24 NOTE — CARE PLAN
Problem: Communication  Goal: The ability to communicate needs accurately and effectively will improve  Outcome: PROGRESSING AS EXPECTED  Intervention: Educate patient and significant other/support system about the plan of care, procedures, treatments, medications and allow for questions  Note: Educated the pt on the plan for biopsy and MRI today  Called IR, they said they will take patient in the late afternoon  MRI is sending the transport for pt  Waiting on MRI and Biopsy  Pt verbalized understanding     Problem: Safety  Goal: Will remain free from falls  Outcome: PROGRESSING AS EXPECTED  Note: Safety precautions in place. Call light within reach. Bed is low and in locked position. Hourly rounding in place.    Intervention: Implement fall precautions  Flowsheets (Taken 9/24/2020 0602)  Environmental Precautions:   Personal Belongings, Wastebasket, Call Bell etc. in Easy Reach   Transferred to Stronger Side   Bed in Low Position   Mobility Assessed & Appropriate Sign Placed   Communication Sign for Patients & Families   Report Given to Other Health Care Providers Regarding Fall Risk   Treaded Slipper Socks on Patient

## 2020-09-24 NOTE — THERAPY
Missed Therapy     Patient Name: Juan M Hinkle  Age:  70 y.o., Sex:  male  Medical Record #: [id number]  Today's Date: 9/24/2020 09/24/20 0800   Interdisciplinary Plan of Care Collaboration   IDT Collaboration with  Other (See Comments)  (chart review)   Collaboration Comments Patient is awaiting further imaging and possible surgical intervention. Will hold PT eval until POC is established      Kathe Jones, PT, DPT, GCS

## 2020-09-24 NOTE — PROGRESS NOTES
[facility] Medicine Daily Progress Note    Date of Service  9/24/2020    Chief Complaint  Right leg weakness, memory loss, and confusion.    Hospital Course   Mr. Hinkle is a 71 y/o male with a past medical history of tobacco abuse and chronic back pain and a pain stimulator who initially presented to [facility] with falls, confusion, and right-sided dysmetria.  Imaging done at the outlying hospital showed 3 large brain masses with edema suspicious for brain metastasis.  He was given 10 mg of IV Decadron and direct admitted to [facility] for neurosurgical evaluation.  A CT chest/abdomen/pelvis was done and was significant for a 5.5 cm JIMMIE mass which appeared to be primary, 2 cm JIMMIE lung mass, and mediastinal LN enlargements.  He was placed on Decadron and Keppra and admitted to the hospital for further evaluation and treatment where he is now pending a lung biopsy in interventional radiology in addition to a stealth brain MRI.      Interval Update  A&O x4 today, confusion appears to be resolved though his affect appears off.  Very mild right-sided weakness noted on both upper and lower extremities.  Speech is mildly slurred.    WBC increased to 15.4 today. Labs otherwise stable.    Afebrile overnight, HR 60s-70s, SBP 140s-150s, O2 saturations within normal limits on room air.    Consultants/Specialty  Neurosurgery    Code Status  Full Code    Disposition  PT/OT stacey pending.  Pending plan by neurosurgery.    Review of Systems  Review of Systems   Constitutional: Negative for chills, fever and malaise/fatigue.   Respiratory: Negative for cough, shortness of breath and wheezing.    Cardiovascular: Negative for chest pain, palpitations and leg swelling.   Gastrointestinal: Negative for abdominal pain, constipation, diarrhea, nausea and vomiting.   Genitourinary: Negative for dysuria, frequency and urgency.   Musculoskeletal:        Denies pain   Neurological: Positive for focal weakness  and weakness. Negative for dizziness, sensory change, speech change, loss of consciousness and headaches.   Psychiatric/Behavioral: Negative for depression. The patient is not nervous/anxious and does not have insomnia.       Physical Exam  Temp:  [36.2 °C (97.1 °F)-36.7 °C (98.1 °F)] 36.2 °C (97.1 °F)  Pulse:  [63-78] 63  Resp:  [17] 17  BP: (137-153)/(83-93) 140/84  SpO2:  [93 %-98 %] 94 %    Physical Exam  Vitals signs and nursing note reviewed.   Constitutional:       General: He is awake. He is not in acute distress.     Appearance: Normal appearance. He is well-developed. He is not ill-appearing.   HENT:      Head: Normocephalic and atraumatic.      Mouth/Throat:      Lips: Pink.      Mouth: Mucous membranes are moist.      Pharynx: No oropharyngeal exudate.   Eyes:      Conjunctiva/sclera: Conjunctivae normal.      Pupils: Pupils are equal, round, and reactive to light.   Neck:      Musculoskeletal: Normal range of motion and neck supple.      Vascular: No JVD.   Cardiovascular:      Rate and Rhythm: Normal rate and regular rhythm.      Pulses: Normal pulses.      Heart sounds: Normal heart sounds.   Pulmonary:      Effort: Pulmonary effort is normal. No respiratory distress.      Breath sounds: Normal breath sounds.   Abdominal:      General: Bowel sounds are normal. There is no distension or abdominal bruit.      Palpations: Abdomen is soft.      Tenderness: There is no abdominal tenderness.   Musculoskeletal: Normal range of motion.         General: No tenderness.      Right lower leg: No edema.      Left lower leg: No edema.   Skin:     General: Skin is warm and dry.   Neurological:      Mental Status: He is alert and oriented to person, place, and time.      GCS: GCS eye subscore is 4. GCS verbal subscore is 5. GCS motor subscore is 6.      Cranial Nerves: Dysarthria present. No cranial nerve deficit.      Motor: Weakness present.      Comments: Mild weakness noted in right upper and right lower  extremities.   Psychiatric:         Attention and Perception: Attention and perception normal.         Mood and Affect: Mood normal. Affect is inappropriate.         Speech: Speech normal.         Behavior: Behavior normal. Behavior is cooperative.         Cognition and Memory: Cognition and memory normal.         Judgment: Judgment normal.     Fluids    Intake/Output Summary (Last 24 hours) at 9/24/2020 1257  Last data filed at 9/23/2020 1821  Gross per 24 hour   Intake 360 ml   Output no documentation   Net 360 ml     Laboratory  Recent Labs     09/23/20  0719 09/24/20  0545   WBC 13.4* 15.4*   RBC 4.91 4.84   HEMOGLOBIN 16.5 16.3   HEMATOCRIT 47.3 46.4   MCV 96.3 95.9   MCH 33.6* 33.7*   MCHC 34.9 35.1   RDW 42.7 42.7   PLATELETCT 273 270   MPV 9.8 9.5     Recent Labs     09/23/20  0719 09/23/20  1016 09/24/20  0545   SODIUM 133* 131* 132*   POTASSIUM 4.2 4.2 4.2   CHLORIDE 97 96 100   CO2 20 20 21   GLUCOSE 122* 223* 113*   BUN 11 12 11   CREATININE 0.39* 0.49* 0.43*   CALCIUM 8.7 8.6 8.6     Recent Labs     09/24/20  0545   APTT 32.8   INR 0.99     Imaging  [id number]   Final Result      1.  Spinal cord stimulator is in place, with leads projecting over the midthoracic spine.   2.  No bowel obstruction.   3.  Known cavitary mass in the left lung.      CT-CHEST,ABDOMEN,PELVIS WITH   Final Result         1. A 5.5 cm cavitary mass in the left upper lobe, highly suggestive of primary malignancy.   2. A 2 cm nodule in the right upper lobe, most likely metastatic. Tiny 5 mm subpleural nodule in the right upper lobe may be benign and can be followed.   3. Borderline enlarged left hilar and supra aortic mediastinal nodes, which may be metastatic.   4. No suspicious lesions below the diaphragm.   5. Nonobstructing left nephrolithiasis.   6. Colonic diverticulosis.      MR-BRAIN-WITH & W/O    (Results Pending)   CT-NEEDLE BX-LUNG-MEDIASTINUM LEFT    (Results Pending)      Assessment/Plan  * Brain metastases  (HCC)- (present on admission)  Assessment & Plan  -Noted on imaging from outside facility.  Likely metastatic disease.  -Continue Decadron and Keppra per neurosurgery, who is following.  -Pending stealth MRI.  - CT chest/abdomen/pelvis with contrast with 5.5 cm JIMMIE lung mass, likely primary.  Lung biopsy pending.    Mass of left lung- (present on admission)  Assessment & Plan  -CT with 5.5 cm JIMMIE and 2 cm JIMMIE masses seen, likely primary for the brain metastases.  Pending IR biopsy today.  -Mediastinal adenopathy also noted.    Hyponatremia- (present on admission)  Assessment & Plan  -Stable at 132 today, due to dehydration.  -Continue IV fluids and repeat BMP in a.m.    Leukocytosis- (present on admission)  Assessment & Plan  -Likely secondary to steroids.  No clinical evidence of infection at this time but will continue to monitor closely.  -Repeat CBC in a.m.    Hyperglycemia- (present on admission)  Assessment & Plan  -Trending down, now near normal.  No need for coverage at this time.  We will continue to monitor.    Alcohol abuse- (present on admission)  Assessment & Plan  -3 glasses of wine per day  -No evidence of withdrawal at this time.  Plan for CIWA if needed.    Tobacco abuse- (present on admission)  Assessment & Plan  -Continue to encourage permanent cessation.  -Patient refused nicotine replacement protocol.       VTE prophylaxis: Clayton Murphy, MSN, RN, APRN, ACNPC-AG, CCRN  Nurse Practitioner, [facility]  [phone]    9/24/2020    12:57 PM

## 2020-09-24 NOTE — CARE PLAN
Problem: Communication  Goal: The ability to communicate needs accurately and effectively will improve  Outcome: PROGRESSING AS EXPECTED   Bedside report received, plan of care discussed with patient, all questions answered, pt is comfortable in bed, no other needs at this time.   Problem: Safety  Goal: Will remain free from injury  Outcome: PROGRESSING AS EXPECTED   Pt has treaded socks on, bed locked and in lowest position, call light and belongings within reach, check on pt hourly, clutter free environment, reinforce the use of call light prior to getting up.

## 2020-09-24 NOTE — THERAPY
Missed Therapy     Patient Name: Juan M Hinkle  Age:  70 y.o., Sex:  male  Medical Record #: [id number]  Today's Date: 9/24/2020 09/24/20 0807   Interdisciplinary Plan of Care Collaboration   Collaboration Comments  awaiting further imaging and neuro POC, will hold this am.

## 2020-09-25 ENCOUNTER — PHARMACY VISIT (OUTPATIENT)
Dept: PHARMACY | Facility: MEDICAL CENTER | Age: 70
End: 2020-09-25
Payer: COMMERCIAL

## 2020-09-25 VITALS
WEIGHT: 194.8 LBS | OXYGEN SATURATION: 92 % | DIASTOLIC BLOOD PRESSURE: 80 MMHG | TEMPERATURE: 97.1 F | HEIGHT: 70 IN | RESPIRATION RATE: 16 BRPM | HEART RATE: 60 BPM | BODY MASS INDEX: 27.89 KG/M2 | SYSTOLIC BLOOD PRESSURE: 129 MMHG

## 2020-09-25 LAB
ANION GAP SERPL CALC-SCNC: 11 MMOL/L (ref 7–16)
BASOPHILS # BLD AUTO: 0.1 % (ref 0–1.8)
BASOPHILS # BLD: 0.01 K/UL (ref 0–0.12)
BUN SERPL-MCNC: 14 MG/DL (ref 8–22)
CALCIUM SERPL-MCNC: 8.7 MG/DL (ref 8.5–10.5)
CHLORIDE SERPL-SCNC: 99 MMOL/L (ref 96–112)
CO2 SERPL-SCNC: 20 MMOL/L (ref 20–33)
CREAT SERPL-MCNC: 0.51 MG/DL (ref 0.5–1.4)
EOSINOPHIL # BLD AUTO: 0 K/UL (ref 0–0.51)
EOSINOPHIL NFR BLD: 0 % (ref 0–6.9)
ERYTHROCYTE [DISTWIDTH] IN BLOOD BY AUTOMATED COUNT: 41 FL (ref 35.9–50)
GLUCOSE SERPL-MCNC: 160 MG/DL (ref 65–99)
GRAM STN SPEC: NORMAL
HCT VFR BLD AUTO: 45.5 % (ref 42–52)
HGB BLD-MCNC: 16.6 G/DL (ref 14–18)
IMM GRANULOCYTES # BLD AUTO: 0.1 K/UL (ref 0–0.11)
IMM GRANULOCYTES NFR BLD AUTO: 0.7 % (ref 0–0.9)
LYMPHOCYTES # BLD AUTO: 1.49 K/UL (ref 1–4.8)
LYMPHOCYTES NFR BLD: 9.9 % (ref 22–41)
MCH RBC QN AUTO: 34 PG (ref 27–33)
MCHC RBC AUTO-ENTMCNC: 36.5 G/DL (ref 33.7–35.3)
MCV RBC AUTO: 93.2 FL (ref 81.4–97.8)
MONOCYTES # BLD AUTO: 0.89 K/UL (ref 0–0.85)
MONOCYTES NFR BLD AUTO: 5.9 % (ref 0–13.4)
NEUTROPHILS # BLD AUTO: 12.49 K/UL (ref 1.82–7.42)
NEUTROPHILS NFR BLD: 83.4 % (ref 44–72)
NRBC # BLD AUTO: 0 K/UL
NRBC BLD-RTO: 0 /100 WBC
PATHOLOGY CONSULT NOTE: NORMAL
PLATELET # BLD AUTO: 262 K/UL (ref 164–446)
PMV BLD AUTO: 9.1 FL (ref 9–12.9)
POTASSIUM SERPL-SCNC: 4 MMOL/L (ref 3.6–5.5)
RBC # BLD AUTO: 4.88 M/UL (ref 4.7–6.1)
RHODAMINE-AURAMINE STN SPEC: NORMAL
SIGNIFICANT IND 70042: NORMAL
SIGNIFICANT IND 70042: NORMAL
SITE SITE: NORMAL
SITE SITE: NORMAL
SODIUM SERPL-SCNC: 130 MMOL/L (ref 135–145)
SOURCE SOURCE: NORMAL
SOURCE SOURCE: NORMAL
WBC # BLD AUTO: 15 K/UL (ref 4.8–10.8)

## 2020-09-25 PROCEDURE — 700102 HCHG RX REV CODE 250 W/ 637 OVERRIDE(OP): Performed by: NEUROLOGICAL SURGERY

## 2020-09-25 PROCEDURE — 36415 COLL VENOUS BLD VENIPUNCTURE: CPT

## 2020-09-25 PROCEDURE — RXMED WILLOW AMBULATORY MEDICATION CHARGE: Performed by: NURSE PRACTITIONER

## 2020-09-25 PROCEDURE — 97165 OT EVAL LOW COMPLEX 30 MIN: CPT

## 2020-09-25 PROCEDURE — A9270 NON-COVERED ITEM OR SERVICE: HCPCS | Performed by: INTERNAL MEDICINE

## 2020-09-25 PROCEDURE — 700102 HCHG RX REV CODE 250 W/ 637 OVERRIDE(OP): Performed by: INTERNAL MEDICINE

## 2020-09-25 PROCEDURE — 99223 1ST HOSP IP/OBS HIGH 75: CPT | Performed by: RADIOLOGY

## 2020-09-25 PROCEDURE — A9270 NON-COVERED ITEM OR SERVICE: HCPCS | Performed by: NEUROLOGICAL SURGERY

## 2020-09-25 PROCEDURE — 85025 COMPLETE CBC W/AUTO DIFF WBC: CPT

## 2020-09-25 PROCEDURE — 80048 BASIC METABOLIC PNL TOTAL CA: CPT

## 2020-09-25 PROCEDURE — 99239 HOSP IP/OBS DSCHRG MGMT >30: CPT | Performed by: HOSPITALIST

## 2020-09-25 PROCEDURE — 97162 PT EVAL MOD COMPLEX 30 MIN: CPT

## 2020-09-25 PROCEDURE — 700105 HCHG RX REV CODE 258: Performed by: INTERNAL MEDICINE

## 2020-09-25 RX ORDER — DEXAMETHASONE 4 MG/1
4 TABLET ORAL EVERY 8 HOURS
Qty: 45 TAB | Refills: 0 | Status: SHIPPED | OUTPATIENT
Start: 2020-09-25

## 2020-09-25 RX ORDER — LEVETIRACETAM 500 MG/1
500 TABLET ORAL 2 TIMES DAILY
Qty: 60 TAB | Refills: 1 | Status: SHIPPED | OUTPATIENT
Start: 2020-09-25

## 2020-09-25 RX ADMIN — LEVETIRACETAM 500 MG: 500 TABLET ORAL at 04:33

## 2020-09-25 RX ADMIN — DEXAMETHASONE 4 MG: 4 TABLET ORAL at 04:33

## 2020-09-25 RX ADMIN — OXYCODONE HYDROCHLORIDE 10 MG: 10 TABLET ORAL at 04:33

## 2020-09-25 RX ADMIN — DOCUSATE SODIUM 50 MG AND SENNOSIDES 8.6 MG 2 TABLET: 8.6; 5 TABLET, FILM COATED ORAL at 04:32

## 2020-09-25 RX ADMIN — DEXAMETHASONE 4 MG: 4 TABLET ORAL at 13:08

## 2020-09-25 RX ADMIN — OXYCODONE HYDROCHLORIDE 10 MG: 10 TABLET ORAL at 13:13

## 2020-09-25 RX ADMIN — GABAPENTIN 300 MG: 300 CAPSULE ORAL at 09:19

## 2020-09-25 RX ADMIN — GABAPENTIN 300 MG: 300 CAPSULE ORAL at 13:08

## 2020-09-25 RX ADMIN — SODIUM CHLORIDE: 9 INJECTION, SOLUTION INTRAVENOUS at 03:15

## 2020-09-25 RX ADMIN — LISINOPRIL 10 MG: 10 TABLET ORAL at 04:32

## 2020-09-25 ASSESSMENT — ACTIVITIES OF DAILY LIVING (ADL): TOILETING: INDEPENDENT

## 2020-09-25 ASSESSMENT — COGNITIVE AND FUNCTIONAL STATUS - GENERAL
CLIMB 3 TO 5 STEPS WITH RAILING: A LITTLE
STANDING UP FROM CHAIR USING ARMS: A LITTLE
SUGGESTED CMS G CODE MODIFIER MOBILITY: CJ
SUGGESTED CMS G CODE MODIFIER DAILY ACTIVITY: CI
HELP NEEDED FOR BATHING: A LITTLE
MOBILITY SCORE: 21
WALKING IN HOSPITAL ROOM: A LITTLE
DAILY ACTIVITIY SCORE: 23

## 2020-09-25 ASSESSMENT — PAIN DESCRIPTION - PAIN TYPE
TYPE: CHRONIC PAIN

## 2020-09-25 ASSESSMENT — GAIT ASSESSMENTS
GAIT LEVEL OF ASSIST: SUPERVISED
DEVIATION: BRADYKINETIC;DECREASED BASE OF SUPPORT
DISTANCE (FEET): 250

## 2020-09-25 NOTE — THERAPY
Occupational Therapy   Initial Evaluation     Patient Name: Juan M Hinkle  Age:  70 y.o., Sex:  male  Medical Record #: [id number]  Today's Date: 9/25/2020     Precautions  Precautions: Fall Risk  Comments: New brain mets     Assessment  Patient is 70 y.o. male presents to skilled OT services following new onset of brain mets, pending biopsy results, plan to OP radiation. Pt demonstrated higher level cognitive deficits, during routine tasks difficulties with multi-tasking, problem solving w/ increased difficulty of activities, fair safety awareness despite cognition deficits. Pt demonstrate very mild RUE weakness, should GM control,  Decreased thumb to finger opposition but coordination intact for ADLs. Pt was able to complete basic self care task and t/f's with supervision, did better w/o FWW during functional mobility d/t cognition deficits.     Plan    Recommend Occupational Therapy for Evaluation only     DC Equipment Recommendations: None  Discharge Recommendations: Other -(might need HHS if and when pt start radiation therapy )     Objective       09/25/20 1112   Prior Living Situation   Prior Services Home-Independent   Housing / Facility 1 Story House   Steps Into Home 0   Steps In Home 0   Bathroom Set up Walk In Shower;Shower Chair;Grab Bars   Equipment Owned 4-Wheel Walker;Tub / Shower Seat;Grab Bar(s) In Tub / Shower   Lives with - Patient's Self Care Capacity Spouse   Comments I w/ ADLs/IADLs baseline. Spouse can provide 24/7 supervision/caregiving with ADLs/IADLs   Prior Level of ADL Function   Self Feeding Independent   Grooming / Hygiene Independent   Bathing Independent   Dressing Independent   Toileting Independent   Prior Level of IADL Function   Medication Management Independent   Laundry Independent   Kitchen Mobility Independent   Finances Independent   Home Management Independent   Shopping Independent   Prior Level Of Mobility Independent Without Device in Community   Driving / Transportation  Driving Independent   Occupation (Pre-Hospital Vocational) Retired Due To Age   Cognition    Cognition / Consciousness X   Level of Consciousness Alert   Attention Impaired  (easily distracted, hard time dividing attention )   Comments appears to have higher level deficits with problem solving, executive functioning, multi-tasking, higher level cognition deficits   Strength Upper Body   Upper Body Strength  X   Comments very mildly weakness in RUE   Coordination Upper Body   Coordination X   Fine Motor Coordination decreased GM at shoulder level, thumb to finger opposition slightly delayed   Balance Assessment   Sitting Balance (Static) Fair +   Sitting Balance (Dynamic) Fair +   Standing Balance (Static) Fair   Standing Balance (Dynamic) Fair   Weight Shift Sitting Fair   Weight Shift Standing Fair   Comments poor use of FWW, does better w/o FWW.    Bed Mobility    Supine to Sit Supervised   Sit to Supine Supervised   Scooting Supervised   Rolling Supervised   ADL Assessment   Eating Modified Independent   Grooming Supervision;Standing   Bathing   (discussed home s/u and modifications)   Upper Body Dressing Supervision   Lower Body Dressing Supervision   Toileting Supervision   Functional Mobility   Sit to Stand Supervised   Bed, Chair, Wheelchair Transfer Supervised   Toilet Transfers Supervised   Comments w/o AD   Anticipated Discharge Equipment and Recommendations   DC Equipment Recommendations None

## 2020-09-25 NOTE — CARE PLAN
Problem: Safety  Goal: Will remain free from injury  Outcome: PROGRESSING AS EXPECTED   Pt has treaded socks on, bed locked and in lowest position, call light and belongings within reach, clutter free environment, equipment out of sight, check on pt hourly.   Problem: Venous Thromboembolism (VTW)/Deep Vein Thrombosis (DVT) Prevention:  Goal: Patient will participate in Venous Thrombosis (VTE)/Deep Vein Thrombosis (DVT)Prevention Measures  Outcome: PROGRESSING AS EXPECTED   SCDs on at all time when in bed, encourage mobilization, and ankle flexion when in bed.

## 2020-09-25 NOTE — CARE PLAN
Problem: Safety  Goal: Will remain free from falls  Outcome: PROGRESSING AS EXPECTED  Intervention: Implement fall precautions  Flowsheets (Taken 9/25/2020 0930)  Environmental Precautions:   Treaded Slipper Socks on Patient   Report Given to Other Health Care Providers Regarding Fall Risk   Personal Belongings, Wastebasket, Call Bell etc. in Easy Reach   Transferred to Stronger Side   Communication Sign for Patients & Families   Mobility Assessed & Appropriate Sign Placed   Bed in Low Position  Note: Safety precautions in place. Call light within reach. Bed is low and in locked position. Hourly rounding in place.       Problem: Discharge Barriers/Planning  Goal: Patient's continuum of care needs will be met  Outcome: PROGRESSING AS EXPECTED  Intervention: Assess potential discharge barriers on admission and throughout hospital stay  Flowsheets (Taken 9/25/2020 9916)  Does Admitting Nurse Feel This Could be a Complex Discharge?: No  Note: Pt to go home today  D/c orders received

## 2020-09-25 NOTE — PROGRESS NOTES
Neurosurgery Progress Note    Subjective:  -Patient had an MRI with 2 small intracranial lesions 1 in the right parietal and 1 in the left occipital left occipital is larger 2 x 1/2 cm  Exam:  No deficit at this time    BP  Min: 127/74  Max: 157/88  Pulse  Av.1  Min: 60  Max: 78  Resp  Av.4  Min: 12  Max: 17  Temp  Av.2 °C (97.1 °F)  Min: 36.1 °C (97 °F)  Max: 36.3 °C (97.4 °F)  SpO2  Av.4 %  Min: 92 %  Max: 99 %        Recent Labs     20  0719 20  0545   WBC 13.4* 15.4*   RBC 4.91 4.84   HEMOGLOBIN 16.5 16.3   HEMATOCRIT 47.3 46.4   MCV 96.3 95.9   MCH 33.6* 33.7*   MCHC 34.9 35.1   RDW 42.7 42.7   PLATELETCT 273 270   MPV 9.8 9.5     Recent Labs     20  0719 20  1016 20  0545   SODIUM 133* 131* 132*   POTASSIUM 4.2 4.2 4.2   CHLORIDE 97 96 100   CO2 20 20 21   GLUCOSE 122* 223* 113*   BUN 11 12 11   CREATININE 0.39* 0.49* 0.43*   CALCIUM 8.7 8.6 8.6     Recent Labs     20  0545   APTT 32.8   INR 0.99           Intake/Output       20 - 2059 20 07 - 20 59       Total  Total       Intake    Total Intake -- -- -- -- -- --       Output    Urine  --  -- --  --  -- --    Number of Times Voided -- 1 x 1 x -- -- --    Total Output -- -- -- -- -- --       Net I/O     -- -- -- -- -- --          No intake or output data in the 24 hours ending 20         • LORazepam  0.5 mg Q4HRS PRN   • acetaminophen  650 mg Q6HRS PRN   • levETIRAcetam  500 mg BID   • dexamethasone  4 mg Q8HRS   • gabapentin  300 mg 4X/DAY   • lisinopril  10 mg BID   • oxyCODONE immediate-release  10 mg Q3HRS PRN   • senna-docusate  2 Tab BID    And   • polyethylene glycol/lytes  1 Packet QDAY PRN    And   • magnesium hydroxide  30 mL QDAY PRN    And   • bisacodyl  10 mg QDAY PRN   • acetaminophen  650 mg Q6HRS PRN   • enalaprilat  1.25 mg Q6HRS PRN   • ondansetron  4 mg Q4HRS PRN   • ondansetron  4 mg Q4HRS PRN        Assessment and Plan:  Hospital day #3  POD #na  Prophylactic anticoagulation: yes         Start date/time: 9/25/2020      MRI of the brain with and without contrast demonstrates a small parietal mass on the right side and a left 1-1/2 x 2 cm mass on the left side without mass-effect.  No other significant lesions are noted.  Chest abdomen pelvis from 9/23/2020 demonstrated a 5-1/2 cm cavitary mass of the left upper lobe consistent with a primary malignancy he also has a 2 cm nodule in the right upper lobe which is consistent with metastases no other abdominal masses noted  Path from percutaneous biopsy of patient's chest mass is still pending    Based on findings we would wait on final pathology for definitive recommendations.  At this time we would recommend radiation oncology and medical oncology consultations to streamline patient's care.  If this ends up being primary lung cancer the patient should be highly radial responsive for his brain metastases.  We would defer to radiation oncology for treatment on this.  Patient will also need to be established with medical oncology cancer care specialist for outpatient chemotherapy.    We are okay with patient starting DVT prophylaxis today 9/25/2020  We will also present the patient at tumor board on Monday, 9/27/2020 to coordinate care and discussed the patient.  It is possible that he may start radiation treatment next week if the final pathology results today    Please call with questions  Total of 30 minutes was spent direct patient care coordination consultation

## 2020-09-25 NOTE — DISCHARGE INSTRUCTIONS
Discharge Instructions    Discharged to home by car with relative. Discharged via wheelchair, hospital escort: Yes.  Special equipment needed: Not Applicable    Be sure to schedule a follow-up appointment with your primary care doctor or any specialists as instructed.     Discharge Plan:   Diet Plan: Discussed  Activity Level: Discussed  Confirmed Follow up Appointment: Patient to Call and Schedule Appointment  Confirmed Symptoms Management: Discussed  Medication Reconciliation Updated: Yes  Influenza Vaccine Indication: Indicated: 65 years and older    I understand that a diet low in cholesterol, fat, and sodium is recommended for good health. Unless I have been given specific instructions below for another diet, I accept this instruction as my diet prescription.   Other diet: Regular    Special Instructions: None    · Is patient discharged on Warfarin / Coumadin?   No       Metastatic Brain Tumor, Adult    A metastatic brain tumor is a collection of cancer cells that develop somewhere else in the body and then spread (metastasize) to the brain and grow there. In most cases, the cancer will also have spread to other areas of the body.  In most cases, the first (primary) cancer was found and treated, but treatment did not prevent the cancer cells from spreading. Signs and symptoms of metastatic brain tumor may occur months or even years after treatment for the primary cancer. In very rare cases, these symptoms may be the first sign of the primary cancer.  What are the causes?  This condition is caused by cancer. Among men, the most common cause of metastatic brain tumor is lung cancer. Among women, the most common cause is breast cancer. Other kinds of cancer can also cause this condition, including:  · Cancer of the skin (melanoma).  · Cancer of the large intestine.  · Cancer of the kidney.  · Cancer of the prostate.  What increases the risk?  You are more likely to develop this condition if you have or have had  cancer.  What are the signs or symptoms?  The symptoms of a metastatic brain tumor are related to the location of the tumor within the brain and may include:  · Headache. This is often the first symptom.  · Stiff neck.  · Nausea and vomiting.  · Fatigue.  · Loss of appetite.  · Avoidance of light (photophobia).  · Loss of consciousness (coma).  · Loss of hearing.  · Difficulty swallowing.  · Changes in how you think or act.  · Tingling, numbness, or weakness in part of your body.  · Jerky movements you cannot control (seizures).  · Problems with balance and walking.  · Clumsiness.  · Changes in speech or vision, such as blurred vision.  How is this diagnosed?  This condition may be diagnosed based on:  · Your medical history.  · Your symptoms.  · A physical exam.  · A test of your nerves and reflexes (neurological exam).  · Blood tests to check for substances that indicate the presence of cancer (tumor markers).  · Imaging studies of your brain, such as:  ? MRI.  ? CT scan.  ? PET scan.  · A biopsy. This means that a small piece of brain tissue is removed and examined under a microscope.  You may need to work with a health care provider who specializes in cancer treatment (oncologist).  How is this treated?  Treatment depends on the type of cancer you have and your overall health. It also depends on the size and location of your tumor(s). The most common treatments include:  · Medicines to help control pain, nausea, and seizures.  · Medicines that kill cancer cells (chemotherapy).  · X-ray treatment to kill cancer cells (radiation).  · A type of radiation therapy that is targeted to exact locations in the brain (stereotactic radiotherapy).  · Surgery to remove brain tumors.  · Occupational and physical therapy.  A combination of chemotherapy and radiation may be used.  Follow these instructions at home:  Eating and drinking  · Be sure to get enough calories and protein in your diet.  · If you have a poor appetite or  nausea, eat small meals often.  · If directed by your health care provider, supplement your diet with protein shakes or milk shakes.  Activity  · Exercise regularly as told by your health care provider. Take two 10-minute walks every day, if you are able.  · If recommended, use assistive devices such as a cane or walker to help you with walking. Ask to meet with a physical therapist to help you with instructions and equipment you might need.  · Return to your normal activities as told by your health care provider. Ask your health care provider what activities are safe for you.  · Ask your health care provider if it is safe for you to drive.  ? Do not drive if you cannot see or think clearly.  ? Do not drive or use heavy machinery while taking prescription pain medicine.  ? Do not drive if you have recently had a seizure. Check with your health care provider about laws that regulate when to drive after a seizure.  General instructions  · Take over-the-counter and prescription medicines only as told by your health care provider.  · Do not use any products that contain nicotine or tobacco, such as cigarettes and e-cigarettes. If you need help quitting, ask your health care provider.  · Know what side effects of treatment to watch for. Keep records of all your treatments.  · Install grab bars and railings in your home to prevent falls.  · It is common to have anxiety and depression when you are coping with cancer. To help relieve anxiety or depression:  ? Talk to friends and loved ones about your feelings.  ? Have a good support system at home.  · Keep all follow-up visits as told by your health care provider. This is important.  Contact a health care provider if:  · You have chills or a fever.  · Your medicine is not controlling your symptoms.  · Your symptoms get worse or you develop new symptoms.  · You have side effects from treatment.  · You are unsteady or have fallen at home.  · You are having trouble caring for  yourself or being cared for at home.  · You feel confused, anxious, or depressed.  Get help right away if:  · You have a seizure.  · You cannot stop vomiting.  · You cannot keep down any foods or fluids.  · You have sudden changes in speech or vision.  · You can no longer take care of yourself at home.  · You have any symptoms that are severe or do not get better with treatment.  Summary  · A metastatic brain tumor is a collection of cancer cells that develop somewhere else in the body and then spread to the brain and grow there. In most cases, the cancer will have also spread to other areas of the body.  · Treatment depends on the type of cancer you have and your overall health. It also depends on the size and location of your tumor(s).  · Contact your health care provider if you have a seizure or have any symptoms that are severe or do not get better with treatment.  This information is not intended to replace advice given to you by your health care provider. Make sure you discuss any questions you have with your health care provider.  Document Released: 09/13/2005 Document Revised: 11/30/2018 Document Reviewed: 02/07/2018  [employer] Patient Education © 2020 [employer]      Depression / Suicide Risk    As you are discharged from this [facility] facility, it is important to learn how to keep safe from harming yourself.    Recognize the warning signs:  · Abrupt changes in personality, positive or negative- including increase in energy   · Giving away possessions  · Change in eating patterns- significant weight changes-  positive or negative  · Change in sleeping patterns- unable to sleep or sleeping all the time   · Unwillingness or inability to communicate  · Depression  · Unusual sadness, discouragement and loneliness  · Talk of wanting to die  · Neglect of personal appearance   · Rebelliousness- reckless behavior  · Withdrawal from people/activities they love  · Confusion- inability to concentrate     If you  or a loved one observes any of these behaviors or has concerns about self-harm, here's what you can do:  · Talk about it- your feelings and reasons for harming yourself  · Remove any means that you might use to hurt yourself (examples: pills, rope, extension cords, firearm)  · Get professional help from the community (Mental Health, Substance Abuse, psychological counseling)  · Do not be alone:Call your Safe Contact- someone whom you trust who will be there for you.  · Call your local CRISIS HOTLINE 355-8746 or 169-116-8407  · Call your local Children's Mobile Crisis Response Team [place] (212) 867-8492 or www.eWise  · Call the toll free National Suicide Prevention Hotlines   · National Suicide Prevention Lifeline 259-933-DOEK (5999)  · National Hope Line Network 800-SUICIDE (762-7722)

## 2020-09-25 NOTE — CONSULTS
RADIATION ONCOLOGY CONSULT    DATE OF SERVICE: 9/25/2020    IDENTIFICATION: A 70 y.o. 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  He is here at the kind request of Dr. Emanuel Mariano..      HISTORY OF PRESENT ILLNESS: Patient's history dates back to earlier this week when he was unable to write a grocery list, had difficulty using simple tools and started developing lower extremity weakness right greater than left.He had his wife drive him to [facility] where a CT scan of the head was obtained showing multiple brain metastasis.  [facility] scan of the brain was obtained 9/22/2022 showing a 21 x 13 mm enhancing lesion in the left parietal lobe also a 13 mm lesion in the right temporal lobe.  There was diffuse white matter edema noted surrounding the lesions.  There was a questionable punctate lesion in the left posterior medial frontal lobe.  These findings are consistent with intracranial metastasis.  He also underwent a CT scan of the chest abdomen pelvis the same day.This revealed a 5.5 cm cavitary mass in the left upper lobe highly suggestive of primary malignancy.  There is a 2 mm nodule in the right upper lobe most likely metastatic and a tiny 5 mm subpleural nodule in the right upper lobe could possibly be benign.  There is also borderline enlarged left hilar and supra aortic mediastinal nodes which could be metastatic.  Biopsy of the left upper lobe lung lesion was obtained 9/23/2020 and pathology is pending.He seen in consultation today about the role of radiation therapy to treat his brain metastasis.    PAST MEDICAL HISTORY:   Low back issues  PAST SURGICAL HISTORY:  Laminectomy  Nerve stimulator placed in the lower back      CURRENT MEDICATIONS:  Current Facility-Administered Medications   Medication Dose Route Frequency Provider Last Rate Last Dose   • LORazepam (ATIVAN) injection 0.5 mg  0.5 mg Intravenous Q4HRS PRN Virginia Brar M.D.       • acetaminophen  (TYLENOL) tablet 650 mg  650 mg Oral Q6HRS PRN Everardo Dang M.D.   650 mg at 20 0536   • levETIRAcetam (KEPPRA) tablet 500 mg  500 mg Oral BID Emanuel Mariano M.D.   500 mg at 20 0433   • dexamethasone (DECADRON) tablet 4 mg  4 mg Oral Q8HRS Emanuel Mariano M.D.   4 mg at 20 1308   • gabapentin (NEURONTIN) capsule 300 mg  300 mg Oral 4X/DAY Hal Clifton D.O.   300 mg at 20 1308   • lisinopril (PRINIVIL) tablet 10 mg  10 mg Oral BID MALIK AndrewO.   10 mg at 20 0432   • oxyCODONE immediate release (ROXICODONE) tablet 10 mg  10 mg Oral Q3HRS PRN MALIK AndrewO.   10 mg at 20 1313   • senna-docusate (PERICOLACE or SENOKOT S) 8.6-50 MG per tablet 2 Tab  2 Tab Oral BID NOLBERTO Andrew.O.   2 Tab at 20 0432    And   • polyethylene glycol/lytes (MIRALAX) PACKET 1 Packet  1 Packet Oral QDAY PRN Hal Clifton D.O.        And   • magnesium hydroxide (MILK OF MAGNESIA) suspension 30 mL  30 mL Oral QDAY PRN MALIK AndrewO.        And   • bisacodyl (DULCOLAX) suppository 10 mg  10 mg Rectal QDAY PRN MALIK AndrewO.       • acetaminophen (TYLENOL) tablet 650 mg  650 mg Oral Q6HRS PRN MALIK AndrewO.       • enalaprilat (VASOTEC) injection 1.25 mg  1.25 mg Intravenous Q6HRS PRN MALIK AndrewO.       • ondansetron (ZOFRAN) syringe/vial injection 4 mg  4 mg Intravenous Q4HRS PRN NOLBERTO Andrew.O.       • ondansetron (ZOFRAN ODT) dispertab 4 mg  4 mg Oral Q4HRS PRN MALIK AndrewO.           ALLERGIES:    Patient has no known allergies.    FAMILY HISTORY:    Mother  of lung cancer        SOCIAL HISTORY:     reports that he has been smoking. He has been smoking about 1.50 packs per day. He has never used smokeless tobacco. He reports current alcohol use. He reports that he does not use drugs.  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    Review of Systems:   Constitutional: Denies fever, chills,  sweats, or weight loss  HENT: Denies neck pain, headache or dizziness, hearing loss  Eyes: Denies vision changes  Respiratory: Denies cough, congestion, SOB, hemoptysis  Cardiovascular: Denies palpations, chest pain or easy fatiguability   Gastrointestinal: Denies diarrhea, abdominal pain or constipation.  No blood in stool.  Genitourinal: Denies hematuria, dysuria, incontinence  Musculoskeletal: Denies new back pain or joint pain   Skin: Denies itching or rash, or new lesions  Neurological: Denies numbness or tingling, weakness, siezures.  But had difficulty with list writing and working with tools and slight lower extremity weakness right greater than left.  Endocrine: Denies thyroid problems or diabetes  Psyche: Denies depression, anxiety, mood changes    Patient has no pain    PHYSICAL EXAM:    2= Ambulatory and capable of all self care, but unable to carry out any work activities.  Up and about more than 50% of waking hours.  Vitals:    09/25/20 0000 09/25/20 0433 09/25/20 0500 09/25/20 0800   BP: 139/89  127/74 129/80   Pulse: 62  66 60   Resp: 17 16 17 16   Temp: 36.3 °C (97.3 °F)  36.2 °C (97.2 °F) 36.2 °C (97.1 °F)   TempSrc: Temporal  Temporal Temporal   SpO2: 94%  93% 92%   Weight:       Height:       Location: Back  Description: Aching, Constant        GENERAL: Well-appearing alert and oriented x3 in no apparent distress  HEENT:  Pupils are equal, round, and reactive to light.  Extraocular muscles   are intact. Sclerae nonicteric.  Conjunctivae pink.  Oral cavity, tongue   protrudes midline.   NECK:   No peripheral adenopathy of the neck, supraclavicular fossa or axillae   bilaterally.  LUNGS:  Clear to ascultation   HEART:  Regular rate and rhythm.  No murmur appreciated  ABDOMEN:  Soft. No evidence of hepatosplenomegaly.    EXTREMITIES:  Without Edema.  NEUROLOGIC:  Cranial nerves II through XII were intact. Normal stance and gait motor and sensory grossly within normal limits.  Right leg slightly  weaker than the left.            IMPRESSION:    A 70 y.o. with  probable metastatic lung cancer to the brain.  Biopsy pending..      RECOMMENDATIONS:   I had a long discussion with the patient about his brain metastasis.  Also we need to wait and get the final pathology report before determining final treatment plans.  He does understand however that the most likely plan of action would be to deliver stereotactic radiosurgery to all the brain metastasis.  He understands we would have to get a stereotactic radiosurgery MRI scan and do planning followed by treatment anywhere between 1-5 fractions.He would like to go home I think that is reasonable and we can schedule a stereotactic radiosurgery scan for next week followed by set up to get treated the following week or sooner as indicated.  I've described the details of radiation along with the side effects both acute and chronic, including but not exclusive to fatigue, skin reaction, , Hair loss possible chance of neurocognitive decline although this is less with stereotactic radiosurgery than whole brain radiation therapy.. Ample time was allowed for questions, and patient understands.    We will schedule the stereotactic radiosurgery scan followed by simulation after he has been presented in tumor board on Monday and we have the pathology results from the lung biopsy.    Thank you for the opportunity to participate in his care.  If any questions or comments, please do not hesitate in calling.    Please note that this dictation was created using voice recognition software. I have made every reasonable attempt to correct obvious errors, but I expect that there are errors of grammar and possibly content that I did not discover before finalizing the note.

## 2020-09-25 NOTE — OR SURGEON
Immediate Post- Operative Note        PostOp Diagnosis: cavitary LEFT upper lobe mass      Procedure(s): CT biopsy of same      Estimated Blood Loss: Less than 5 ml        Complications: trace extrapleural gas            9/24/2020     5:08 PM     Yovani Garrison M.D.

## 2020-09-25 NOTE — DOCUMENTATION QUERY
[facility]                                                                       Query Response Note      PATIENT:               HAI YOU  ACCT #:                  [id number]  MRN:                     [id number]  :                      1950  ADMIT DATE:       2020 7:29 PM  DISCH DATE:          RESPONDING  PROVIDER #:        [id number]           QUERY TEXT:    Altered mental status is documented in the Medical Record.  Can a more specific diagnosis be provided?    NOTE:  If the appropriate response is not listed below, please respond with a new note.    The patient's Clinical Indicators include:  Clinical indicators-  Per H&P: Presented with memory issues and confusion. Started 1 week ago and progressed. Unable to figure out how to get a wrench onto a bolt. Word finding difficulty.  Per PN: Head imaging showed 3 large brain masses with edema, suspicious for brain mets.    Treatments-  MRI of brain  CBC, BMP    Risks-   Confusion; Metastatic lesions of brain; Alcohol abuse    Thank you,  Rand Moore, [facility] RN  Connect via Tiger Text  Options provided:   -- Acute encephalopathy due to cerebral edema & lesions   -- Acute alcoholic encephalopathy   -- Acute encephalopathy due to other medical condition, (please specify other medical condition)   -- Acute encephalopathy of unknown etiology   -- Delirium due to cerebral edema & lesions   -- Delirium due to known physiological condition, (please specify the known physiological condition)   -- Delirium of unknown etiology   -- Unable to determine      Query created by: Rand Moore on 2020 1:18 PM    RESPONSE TEXT:    Acute encephalopathy due to cerebral edema & lesions          Electronically signed by:  MILENA JOHNSTON MD 2020 4:27 PM

## 2020-09-25 NOTE — DISCHARGE SUMMARY
Discharge Summary    CHIEF COMPLAINT ON ADMISSION  Right leg weakness, memory loss, confusion    Reason for Admission  Brain metastases    Admission Date  9/22/2020    CODE STATUS  Full Code    HPI & HOSPITAL COURSE  Mr. Hinkle is a 69 y/o male with a past medical history of tobacco abuse and chronic back pain with an implanted spinal stimulator who initially presented to [facility] with falls, confusion, and right-sided dysmetria. Imaging done at the outlying hospital showed brain masses with edema suspicious for brain metastasis.  He was given 10 mg of IV Decadron and direct admitted to [facility] for neurosurgical evaluation.  A CT chest/abdomen/pelvis was done and was significant for a 5.5 cm JIMMIE mass which appeared to be primary, a 2 cm JIMMIE lung mass, and mediastinal lymph node enlargement.  He was placed on PO decadron and keppra and admitted to the hospital for further evaluation and treatment where a lung biopsy was done on 9/24/2020, pathology is still pending.  A stealth MRI was also ordered but was unable to be done due to the patient's spinal stimulator.  Neurosurgery was consulted and is waiting on final pathology for definitive recommendations.  They did, however, recommend radiation and medical oncology consultations which were done prior to the patient's discharge.  Dr. Koo of medical oncology will arrange to see the patient in the outpatient setting.  Dr. Crystal of radiation oncology was consulted and has cleared the patient for discharge.  They will be contacting the patient after Monday when he is discussed at tumor board.  His initial symptoms have improved/nearly resolved at this point and all disciplines have cleared him for discharge today.  Per neurosurgery recommendation he is being kept on Keppra and Decadron.  This can be further managed in the outpatient setting.  His vital signs and lab work have remained stable and he is medically clear for discharge  today.    Therefore, he is discharged in fair and stable condition to home with close outpatient follow-up.    The patient met 2-midnight criteria for an inpatient stay at the time of discharge.    Discharge Date  9/25/2020    FOLLOW UP ITEMS POST DISCHARGE  Radiation oncology  Medical oncology  Neurosurgery  PCP in 7-10 days    DISCHARGE DIAGNOSES  Principal Problem:    Brain metastases (HCC) POA: Yes  Active Problems:    Mass of left lung POA: Yes    Leukocytosis POA: Yes    Hyponatremia POA: Yes    Tobacco abuse POA: Yes    Alcohol abuse POA: Yes    Hyperglycemia POA: Yes  Resolved Problems:    * No resolved hospital problems. *    FOLLOW UP  Allegra Crystal M.D.  [address]  [place] [address]  [phone]    Schedule an appointment as soon as possible for a visit      Emanuel Mariano M.D.  [address]  [place] [address]  [phone]    Schedule an appointment as soon as possible for a visit      Jessica Koo M.D.  [address]  [place] [address]  [phone]    Schedule an appointment as soon as possible for a visit    MEDICATIONS ON DISCHARGE     Medication List      Start taking these medications      Instructions   dexamethasone 4 MG Tabs  Commonly known as: DECADRON   Take 1 Tab by mouth every 8 hours.  Dose: 4 mg     levETIRAcetam 500 MG Tabs  Commonly known as: KEPPRA   Take 1 Tab by mouth 2 Times a Day.  Dose: 500 mg        Continue taking these medications      Instructions   gabapentin 300 MG Caps  Commonly known as: NEURONTIN   Take 300 mg by mouth 4 times a day.  Dose: 300 mg     lisinopril 10 MG Tabs  Commonly known as: PRINIVIL   Take 10 mg by mouth 2 times a day.  Dose: 10 mg     oxyCODONE immediate-release 5 MG Tabs  Commonly known as: ROXICODONE   Take 10 mg by mouth every 3 hours as needed for Severe Pain.  Dose: 10 mg          Allergies  No Known Allergies    DIET  Orders Placed This Encounter   Procedures   • Diet Order Regular     Standing Status:    Standing     Number of Occurrences:   1     Order Specific Question:   Diet:     Answer:   Regular [1]     ACTIVITY  As tolerated.  Exercise encouraged.  Weight bearing as tolerated    CONSULTATIONS  Radiation oncology-Dr. Crystal  Medical oncology-Dr. Koo   Neurosurgery-Dr. Mariano    PROCEDURES  None    LABORATORY  Lab Results   Component Value Date    SODIUM 130 (L) 09/25/2020    POTASSIUM 4.0 09/25/2020    CHLORIDE 99 09/25/2020    CO2 20 09/25/2020    GLUCOSE 160 (H) 09/25/2020    BUN 14 09/25/2020    CREATININE 0.51 09/25/2020      Lab Results   Component Value Date    WBC 15.0 (H) 09/25/2020    HEMOGLOBIN 16.6 09/25/2020    HEMATOCRIT 45.5 09/25/2020    PLATELETCT 262 09/25/2020      Total time of the discharge process exceeds 32 minutes.      Trudy Murphy, MSN, RN, APRN, ACNPC-AG, CCRN  Nurse Practitioner, [facility]  [phone]    9/25/2020    1:26 PM

## 2020-09-26 NOTE — DISCHARGE PLANNING
Meds-to-Beds: Discharge prescription orders listed below delivered to patient's bedside. RN notified. Patient declined consult. Patient elected to have co-payment billed to patient account.      Juan M Hinkle   Home Medication Instructions JASON:[id number]    Printed on:09/25/20 2488   Medication Information                      dexamethasone (DECADRON) 4 MG Tab  Take 1 Tab by mouth every 8 hours.             levETIRAcetam (KEPPRA) 500 MG Tab  Take 1 Tab by mouth 2 Times a Day.               Cynthia Noble, PharmD

## 2020-09-26 NOTE — PROGRESS NOTES
Pt. discharged to home. Pt left unit via WC with escort.   IV pulled out.   Pt states he has a walker at home  Pt said he will make all the appointments with the doctors  Reviewed discharge instructions, follow up appointments, and prescription with the patient. Patient states understanding of all the instructions. Discharge instructions and personal belongings with patient upon leaving.   Med to beds were delivered to the patient

## 2020-09-28 ENCOUNTER — PATIENT OUTREACH (OUTPATIENT)
Dept: OTHER | Facility: MEDICAL CENTER | Age: 70
End: 2020-09-28

## 2020-09-28 DIAGNOSIS — C79.31 BRAIN METASTASES: Primary | ICD-10-CM

## 2020-09-28 LAB
BACTERIA FLD AEROBE CULT: NORMAL
GRAM STN SPEC: NORMAL
SIGNIFICANT IND 70042: NORMAL
SITE SITE: NORMAL
SOURCE SOURCE: NORMAL

## 2020-09-28 NOTE — PROGRESS NOTES
"Received information that patient needs MRI tomorrow and has no transportation from [facility].  Call placed to patient and introduced self.  Explained role of cancer nurse navigator.  Pt reports he is doing better and it took him a couple of days to get back into a \"somewhat normal\" routine.  Pt states used to drive himself everywhere.  He reports his wife can drive locally, but not able to drive on freeways or long distance.  Pt states does not have friends or family in area that can assist him.  He has nephew lives in [place], but just started new job and can not help drive.  Pt denies current financial need.  Pt talked about has not really had time to process or think about things.  He stated he is able to start making some phone calls and see what may be available.  Pt reports understands his limitations and knows he should not be driving at this time.  Let him know navigator will also look into available resources.  Will [place] back this afternoon on what options may be.    Pt called back reporting plans to call [facility].  He states that it is just taking time to process things.  Pt stating he found out that something was wrong when he tried making shopping list and could not write.  He went to ER, knowing something was not right.  Pt saying that tomorrow is so quick.  Asked if he wanted to try and move out a day.  Pt said he will make some phone calls and see what may be available before changing appointment.  Let him know referral to  was also placed to assist with possible transportation resources.    Pt called and reported was not aware there are so limited resources for transportation.  He stated wife will drive him to his appointment.  Provided address and information on MRI appointment tomorrow.  Pt is not claustrophobic.  Pt asking same questions several times and easily gets mixed up.  Repeated information and will provide information for radiation mapping appointment on Wed " tomorrow.  Encouraged patient to call if there is any issues with appointment tomorrow.  Updated Samantha, radiation oncoogy MA and Ghislaine oncology social worker.

## 2020-09-29 ENCOUNTER — APPOINTMENT (OUTPATIENT)
Dept: RADIOLOGY | Facility: MEDICAL CENTER | Age: 70
End: 2020-09-29
Attending: RADIOLOGY
Payer: COMMERCIAL

## 2020-09-30 ENCOUNTER — PATIENT OUTREACH (OUTPATIENT)
Dept: OTHER | Facility: MEDICAL CENTER | Age: 70
End: 2020-09-30

## 2020-09-30 ENCOUNTER — HOSPITAL ENCOUNTER (OUTPATIENT)
Dept: RADIATION ONCOLOGY | Facility: MEDICAL CENTER | Age: 70
End: 2020-09-30
Attending: RADIOLOGY
Payer: COMMERCIAL

## 2020-09-30 NOTE — PROGRESS NOTES
"Pt left message yesterday afternoon regarding had gone to scheduled MRI and was not able to get it done.  Pt reporting has neuro stimulator and machine was \"too strong\" to be levi to do.  Pt stating he is able to place device in MRI mode, but was not able to do on machine patient was scheduled on.  Pt also saying it is \"too much\" for him to do radiation mapping today.  He discussed that he needed to do MRI prior to mapping anyway.  Pt asking if MRI can be done in [place].  He had already called around and they are able to do MRI that is compatible with neuro stimulator.  Unsure if SRS protocol MRI can be done in [place], but will follow up with Dr Crystal on above.  Pt very grateful.  He will also email ID card for neuro stimulator.  Pt also said he has appointment with Dr Koo on 10/8 at 4:30.    Updated Dr Crystal and Samantha NICE.  SRS MRI can not be done in [place], so will need to be scheduled in [facility].  Will work on trying to coordinate appointments for same day as Dr Koo's to minimize trips to [place].    Communicated back to patient.  He is was grateful to try and get scheduled all on same day.  He understands that may not happen and will \"do what needs to be done\".     Printed neuromodulation patient ID card and had it scanned into Epic.  Provided copy to radiation oncology for information as well.    Notified by TEX Singh, MRI scheduled for 10/8 with check in time of 10:15 for 10:30 appointment.  He will then have his radiation simulation/mapping at 1 pm same day.    Call placed to patient with update, left message for him to call nurse navigator regarding appointments.    Call placed to patient.  He requested information for scan be sent to him via email.  Let him know it was all arranged for the 8th.  Pt very grateful.  "

## 2020-10-02 ENCOUNTER — HOSPITAL ENCOUNTER (OUTPATIENT)
Dept: RADIATION ONCOLOGY | Facility: MEDICAL CENTER | Age: 70
End: 2020-10-31
Attending: RADIOLOGY
Payer: COMMERCIAL

## 2020-10-04 NOTE — DOCUMENTATION QUERY
"                                                                         [facility]                                                                       Query Response Note      PATIENT:               HAI YOU  ACCT #:                  [id number]  MRN:                     [id number]  :                      1950  ADMIT DATE:       2020 7:29 PM  DISCH DATE:        2020 5:51 PM  RESPONDING  PROVIDER #:        [id number]           QUERY TEXT:    The finding of poorly differentiated  lung adenocarcinoma is documented in the pathology report.  Per coding guidelines, coders cannot code diagnosis from the pathology report without the Attending Physician's documentation of the diagnosis. Based on clinical findings, risk factors and treatment, can this diagnosis be further specified?    NOTE:  If an appropriate response is not listed below, please respond with a new note.        The patient's Clinical Indicators include:      clinical indicators:  Transferred here with cerebral edema and brain metastases noted on CT scan from outside facility likely metastatic disease.  CT scan of chest: \"A cavitary lesion in the lateral aspect of the left upper lobe\"  \"highly suggestive of primary malignancy.\"    Treatment:  Lung biopsy of left upper lobe. findings \"poorly differentiated lung adenocarcinoma\"  CT of chest  VS     Risk factors:  hyponatremia  brain mets  smoker  hyperglycemia  Options provided:   -- Agree with pathology finding of poorly differentiated lung adenocarcinoma   -- Disagree with pathology finding of poorly differentiated lung adenocarcinoma   -- Unable to determine      Query created by: Virginia Zaragoza on 10/1/2020 6:58 PM    RESPONSE TEXT:    Agree with pathology finding of poorly differentiated lung adenocarcinoma          Electronically signed by:  KATHIA PADRON 10/3/2020 8:55 PM              "

## 2020-10-08 ENCOUNTER — HOSPITAL ENCOUNTER (OUTPATIENT)
Dept: RADIATION ONCOLOGY | Facility: MEDICAL CENTER | Age: 70
End: 2020-10-08
Payer: COMMERCIAL

## 2020-10-08 ENCOUNTER — HOSPITAL ENCOUNTER (OUTPATIENT)
Dept: RADIOLOGY | Facility: MEDICAL CENTER | Age: 70
End: 2020-10-08
Attending: RADIOLOGY
Payer: COMMERCIAL

## 2020-10-08 DIAGNOSIS — C79.31 BRAIN METASTASES: ICD-10-CM

## 2020-10-08 PROCEDURE — 77470 SPECIAL RADIATION TREATMENT: CPT | Performed by: RADIOLOGY

## 2020-10-08 PROCEDURE — 77263 THER RADIOLOGY TX PLNG CPLX: CPT | Performed by: RADIOLOGY

## 2020-10-08 PROCEDURE — 77290 THER RAD SIMULAJ FIELD CPLX: CPT | Mod: 26 | Performed by: RADIOLOGY

## 2020-10-08 PROCEDURE — 77470 SPECIAL RADIATION TREATMENT: CPT | Mod: 26 | Performed by: RADIOLOGY

## 2020-10-08 PROCEDURE — 77334 RADIATION TREATMENT AID(S): CPT | Mod: 26 | Performed by: RADIOLOGY

## 2020-10-08 PROCEDURE — 77334 RADIATION TREATMENT AID(S): CPT | Performed by: RADIOLOGY

## 2020-10-08 PROCEDURE — 77290 THER RAD SIMULAJ FIELD CPLX: CPT | Performed by: RADIOLOGY

## 2020-10-08 PROCEDURE — A9576 INJ PROHANCE MULTIPACK: HCPCS | Performed by: RADIOLOGY

## 2020-10-08 PROCEDURE — 700117 HCHG RX CONTRAST REV CODE 255: Performed by: RADIOLOGY

## 2020-10-08 PROCEDURE — 70553 MRI BRAIN STEM W/O & W/DYE: CPT

## 2020-10-08 RX ADMIN — GADOTERIDOL 20 ML: 279.3 INJECTION, SOLUTION INTRAVENOUS at 11:20

## 2020-10-15 LAB
CHEMOTHERAPY INFUSION START DATE: NORMAL
CHEMOTHERAPY INFUSION START DATE: NORMAL
CHEMOTHERAPY INFUSION STOP DATE: NORMAL
CHEMOTHERAPY INFUSION STOP DATE: NORMAL
CHEMOTHERAPY RECORDS: 0.86
CHEMOTHERAPY RECORDS: 10.81
CHEMOTHERAPY RECORDS: 14.87
CHEMOTHERAPY RECORDS: 1486.8
CHEMOTHERAPY RECORDS: 24.35
CHEMOTHERAPY RECORDS: 2435.2
CHEMOTHERAPY RECORDS: 3242.4
CHEMOTHERAPY RECORDS: 86
CHEMOTHERAPY RECORDS: NORMAL
RAD ONC ARIA COURSE TREATMENT ELAPSED DAYS: NORMAL
RAD ONC ARIA COURSE TREATMENT ELAPSED DAYS: NORMAL
RAD ONC ARIA REFERENCE POINT DOSAGE GIVEN TO DATE: 0
RAD ONC ARIA REFERENCE POINT ID: NORMAL

## 2020-10-15 PROCEDURE — 77295 3-D RADIOTHERAPY PLAN: CPT | Mod: 26 | Performed by: RADIOLOGY

## 2020-10-15 PROCEDURE — 77334 RADIATION TREATMENT AID(S): CPT | Performed by: RADIOLOGY

## 2020-10-15 PROCEDURE — 77300 RADIATION THERAPY DOSE PLAN: CPT | Mod: 26 | Performed by: RADIOLOGY

## 2020-10-15 PROCEDURE — 77334 RADIATION TREATMENT AID(S): CPT | Mod: 26 | Performed by: RADIOLOGY

## 2020-10-15 PROCEDURE — 77300 RADIATION THERAPY DOSE PLAN: CPT | Performed by: RADIOLOGY

## 2020-10-15 PROCEDURE — 77295 3-D RADIOTHERAPY PLAN: CPT | Performed by: RADIOLOGY

## 2020-10-16 PROCEDURE — 77370 RADIATION PHYSICS CONSULT: CPT | Performed by: RADIOLOGY

## 2020-10-19 ENCOUNTER — HOSPITAL ENCOUNTER (OUTPATIENT)
Dept: RADIATION ONCOLOGY | Facility: MEDICAL CENTER | Age: 70
End: 2020-10-19
Payer: COMMERCIAL

## 2020-10-19 LAB
CHEMOTHERAPY INFUSION START DATE: NORMAL
CHEMOTHERAPY RECORDS: 20
CHEMOTHERAPY RECORDS: 2000
CHEMOTHERAPY RECORDS: 2700
CHEMOTHERAPY RECORDS: 9
CHEMOTHERAPY RECORDS: NORMAL
CHEMOTHERAPY RX CANCER: NORMAL
DATE 1ST CHEMO CANCER: NORMAL
RAD ONC ARIA COURSE LAST TREATMENT DATE: NORMAL
RAD ONC ARIA COURSE TREATMENT ELAPSED DAYS: NORMAL
RAD ONC ARIA REFERENCE POINT DOSAGE GIVEN TO DATE: 10.81
RAD ONC ARIA REFERENCE POINT DOSAGE GIVEN TO DATE: 20
RAD ONC ARIA REFERENCE POINT DOSAGE GIVEN TO DATE: 24.35
RAD ONC ARIA REFERENCE POINT DOSAGE GIVEN TO DATE: 9
RAD ONC ARIA REFERENCE POINT ID: NORMAL
RAD ONC ARIA REFERENCE POINT SESSION DOSAGE GIVEN: 10.81
RAD ONC ARIA REFERENCE POINT SESSION DOSAGE GIVEN: 20
RAD ONC ARIA REFERENCE POINT SESSION DOSAGE GIVEN: 24.35
RAD ONC ARIA REFERENCE POINT SESSION DOSAGE GIVEN: 9

## 2020-10-19 PROCEDURE — 77373 STRTCTC BDY RAD THER TX DLVR: CPT | Performed by: RADIOLOGY

## 2020-10-19 PROCEDURE — 77280 THER RAD SIMULAJ FIELD SMPL: CPT | Performed by: RADIOLOGY

## 2020-10-19 PROCEDURE — 77280 THER RAD SIMULAJ FIELD SMPL: CPT | Mod: 26 | Performed by: RADIOLOGY

## 2020-10-19 PROCEDURE — 77435 SBRT MANAGEMENT: CPT | Performed by: RADIOLOGY

## 2020-10-19 NOTE — PROGRESS NOTES
NEUROSURGERY STEREOTACTIC PROCEDURE NOTE    Patient name:  Juan M Hinkle    Primary Physician:  Omkar Ramirez M.D. MRN: [id number]  CSN: [id number]   Referring physician:  No ref. provider found : 1950, 70 y.o.     ENCOUNTER DATE:  10/19/2020    DIAGNOSIS:  Brain meetastasis  Left frontal, left cerebellar, right cerebellar    PROCEDURE:   SAM-BEAM STEREOTACTIC RADIOTHERAPY    CLASSIFICATION:  [x]SIMPLE  []COMPLEX    ISOCENTERS:  []1 []2 [x]3+    TREATMENT SUMMARY:  Aria Treatment Information        Some values may be hidden. Unless noted otherwise, only the newest values recorded on each date are displayed.         Aria Treatment Summary 10/15/20   02,03 SRT Plan from Course C1QA   Fraction 0 of 1   Elapsed Course Days  @    Prescribed Fraction Dose 86 cGy   Prescribed Total Dose 86 cGy   L Frontal SRS Plan from Course C1QA   Fraction 0 of 1   Elapsed Course Days  @    Prescribed Fraction Dose 1,487 cGy   Prescribed Total Dose 1,487 cGy   10x Calc Pt Reference Point from Course C1QA   Elapsed Course Days  @    Session Dose -   Total Dose 0 cGy   10x Calc Pt1 Reference Point from Course C1QA   Elapsed Course Days  @    Session Dose -   Total Dose 0 cGy   10x FFF Reference Point from Course C1QA   Elapsed Course Days  @    Session Dose -   Total Dose 0 cGy   10x FFF1 Reference Point from Course C1QA   Elapsed Course Days  @    Session Dose -   Total Dose 0 cGy   15x Reference Point from Course C1QA   Elapsed Course Days  @    Session Dose -   Total Dose 0 cGy   15x1 Reference Point from Course C1QA   Elapsed Course Days  @    Session Dose -   Total Dose 0 cGy   6x Calc Pt Reference Point from Course C1QA   Elapsed Course Days  @    Session Dose -   Total Dose 0 cGy   6x Calc Pt1 Reference Point from Course C1QA   Elapsed Course Days  @    Session Dose -   Total Dose 0 cGy   6x FFF Reference Point from Course C1QA   Elapsed Course Days  @    Session Dose -   Total Dose 0 cGy   6x FFF1 Reference Point  from Course C1QA   Elapsed Course Days  @    Session Dose -   Total Dose 0 cGy   Exit Pt Reference Point from Course C1QA   Elapsed Course Days  @    Session Dose -   Total Dose 0 cGy   Exit Pt1 Reference Point from Course C1QA   Elapsed Course Days  @    Session Dose -   Total Dose 0 cGy   Verification Reference Point from Course C1QA   Elapsed Course Days  @    Session Dose -   Total Dose 0 cGy   Verification1 Reference Point from Course C1QA   Elapsed Course Days  @    Session Dose -   Total Dose 0 cGy   02,03 SRT Plan from Course Planning C1   Fraction 0 of 3   Elapsed Course Days  @    Prescribed Fraction Dose 1,081 cGy   Prescribed Total Dose 3,242 cGy   L Frontal SRS Plan from Course Planning C1   Fraction 0 of 1   Elapsed Course Days  @    Prescribed Fraction Dose 2,435 cGy   Prescribed Total Dose 2,435 cGy   02,03 SRT Reference Point from Course Planning C1   Elapsed Course Days  @    Session Dose -   Total Dose 0 cGy   02,03 SRT CP Reference Point from Course Planning C1   Elapsed Course Days  @    Session Dose -   Total Dose 0 cGy   L Frontal SRS Reference Point from Course Planning C1   Elapsed Course Days  @    Session Dose -   Total Dose 0 cGy   L Frontal SRS CP Reference Point from Course Planning C1   Elapsed Course Days  @    Session Dose -   Total Dose 0 cGy                After discussion of risks, benefits, and alternatives, the patient elected to proceed with stereotactic radiosurgery. The patient underwent a planning session consisting of radiosurgery protocol MRI, as well as a CT scan in their mask. The image data sets were fused for treatment planning. The lesion was contoured, critical structures were contoured as well, including brain stem, optic nerves, optic chiasm, globes, etc. A plan was then created with the goal of optimizing radiation to the lesion and minimizing radiation to critical structures. Once a plan was approved by all involved parties, the patient was brought to the  treatment room, attached to the table, and aligned. When the alignment was confirmed, the patient proceeded with treatment.

## 2020-10-21 ENCOUNTER — HOSPITAL ENCOUNTER (OUTPATIENT)
Dept: RADIATION ONCOLOGY | Facility: MEDICAL CENTER | Age: 70
End: 2020-10-21
Payer: COMMERCIAL

## 2020-10-21 VITALS
TEMPERATURE: 97.6 F | DIASTOLIC BLOOD PRESSURE: 77 MMHG | HEART RATE: 78 BPM | OXYGEN SATURATION: 93 % | SYSTOLIC BLOOD PRESSURE: 135 MMHG

## 2020-10-21 LAB
CHEMOTHERAPY INFUSION START DATE: NORMAL
CHEMOTHERAPY RECORDS: 1800
CHEMOTHERAPY RECORDS: 9
CHEMOTHERAPY RECORDS: NORMAL
CHEMOTHERAPY RX CANCER: NORMAL
DATE 1ST CHEMO CANCER: NORMAL
FUNGUS SPEC CULT: NORMAL
RAD ONC ARIA COURSE LAST TREATMENT DATE: NORMAL
RAD ONC ARIA COURSE TREATMENT ELAPSED DAYS: NORMAL
RAD ONC ARIA REFERENCE POINT DOSAGE GIVEN TO DATE: 18
RAD ONC ARIA REFERENCE POINT DOSAGE GIVEN TO DATE: 21.59
RAD ONC ARIA REFERENCE POINT ID: NORMAL
RAD ONC ARIA REFERENCE POINT ID: NORMAL
RAD ONC ARIA REFERENCE POINT SESSION DOSAGE GIVEN: 10.78
RAD ONC ARIA REFERENCE POINT SESSION DOSAGE GIVEN: 9
SIGNIFICANT IND 70042: NORMAL
SITE SITE: NORMAL
SOURCE SOURCE: NORMAL

## 2020-10-21 PROCEDURE — 77280 THER RAD SIMULAJ FIELD SMPL: CPT | Performed by: RADIOLOGY

## 2020-10-21 PROCEDURE — 77373 STRTCTC BDY RAD THER TX DLVR: CPT | Performed by: RADIOLOGY

## 2020-10-21 PROCEDURE — 77280 THER RAD SIMULAJ FIELD SMPL: CPT | Mod: 26 | Performed by: RADIOLOGY

## 2020-10-21 ASSESSMENT — PAIN SCALES - GENERAL: PAINLEVEL: NO PAIN

## 2020-10-21 NOTE — ON TREATMENT VISIT
ON TREATMENT  NOTE  RADIATION ONCOLOGY DEPARTMENT    Patient name:  Juan M Hinkle    Primary Physician:  Omkar Ramirez M.D. MRN: [id number]  [facility]: [id number]   Referring physician:  Trudy Murphy A.P* : 1950, 70 y.o.     ENCOUNTER DATE:  10/21/20    DIAGNOSIS:    No matching staging information was found for the patient.    TREATMENT SUMMARY:  Aria Treatment Information        Some values may be hidden. Unless noted otherwise, only the newest values recorded on each date are displayed.         Aria Treatment Summary 10/21/20   Course First Treatment Date 10/19/2020   Course Last Treatment Date 10/21/2020   02,03 SRT Plan from Course C1_3 Lesion SRT   02,03 SRT TB3 Plan from Course C1_3 Lesion SRT   Fraction 1 of 2   Elapsed Course Days 2 @ [id number]   Prescribed Fraction Dose 900 cGy   Prescribed Total Dose 1,800 cGy   L Frontal SRS Plan from Course C1_3 Lesion SRT   02,03 SRT Reference Point from Course C1_3 Lesion SRT   Elapsed Course Days 2 @ [id number]   Session Dose 900 cGy   Total Dose 1,800 cGy   02,03 SRT CP Reference Point from Course C1_3 Lesion SRT   Elapsed Course Days 2 @ [id number]   Session Dose 1,078 cGy   Total Dose 2,159 cGy   L Frontal SRS Reference Point from Course C1_3 Lesion SRT   L Frontal SRS CP Reference Point from Course C1_3 Lesion SRT   02,03 SRT Plan from Course C1QA   L Frontal SRS Plan from Course C1QA   10x Calc Pt Reference Point from Course C1QA   10x Calc Pt1 Reference Point from Course C1QA   10x FFF Reference Point from Course C1QA   10x FFF1 Reference Point from Course C1QA   15x Reference Point from Course C1QA   15x1 Reference Point from Course C1QA   6x Calc Pt Reference Point from Course C1QA   6x Calc Pt1 Reference Point from Course C1QA   6x FFF Reference Point from Course C1QA   6x FFF1 Reference Point from Course C1QA   Exit Pt Reference Point from Course C1QA   Exit Pt1 Reference Point from Course C1QA   Verification Reference Point from  Course C1QA   Verification1 Reference Point from Course C1QA   02,03 SRT Plan from Course Planning C1   L Frontal SRS Plan from Course Planning C1   02,03 SRT Reference Point from Course Planning C1   02,03 SRT CP Reference Point from Course Planning C1   L Frontal SRS Reference Point from Course Planning C1   L Frontal SRS CP Reference Point from Course Planning C1             SUBJECTIVE:  Tolerating therapy without event.  He stopped his Keppra.  He has decreased his dexamethasone to half pill twice a day he is having no side effects.        VITAL SIGNS:  KPS: 90, Able to carry on normal activity; minor signs or symptoms of disease (ECOG equivalent 0)  Encounter Vitals  Temperature: 36.4 °C (97.6 °F)  Temp src: Temporal  Blood Pressure : 135/77  Pulse: 78  Pulse Oximetry: 93 %  Pain Score: No pain  Pain Assessment 10/21/2020   Pain Assessment Denies Pain   Pain Score 0          PHYSICAL EXAM:  No change      Toxicity Assessment 10/21/2020   Toxicity Assessment Brain   Fatigue (lethargy, malaise, asthenia) None   Radiation Dermatitis None   Nausea None   Mouth Dryness Normal   Headache None   External Auditory Canal Normal   Inner Ear/Hearing Normal   Middle Ear/Hearing Normal   Dizziness/lightheadedness None   Memory loss Normal   Neuropathy - Motor Normal   Seizure None   Vertigo None           IMPRESSION:  Cancer Staging  No matching staging information was found for the patient.    PLAN:  No change in treatment plan .Patient will decrease his dexamethasone the day after completion of therapy to a half a pill in the morning for 4 days.  He will continue a half a pill twice a day until that time.  I will see him back in follow-up in 6 weeks once he is completed with therapy.    Disposition:  Treatment plan reviewed. Questions answered. Continue therapy outlined.     Allegra Crystal M.D.    No orders of the defined types were placed in this encounter.

## 2020-10-22 ENCOUNTER — HOSPITAL ENCOUNTER (OUTPATIENT)
Dept: RADIATION ONCOLOGY | Facility: MEDICAL CENTER | Age: 70
End: 2020-10-22
Payer: COMMERCIAL

## 2020-10-22 LAB
CHEMOTHERAPY INFUSION START DATE: NORMAL
CHEMOTHERAPY INFUSION START DATE: NORMAL
CHEMOTHERAPY INFUSION STOP DATE: NORMAL
CHEMOTHERAPY RECORDS: 1800
CHEMOTHERAPY RECORDS: 1800
CHEMOTHERAPY RECORDS: 20
CHEMOTHERAPY RECORDS: 2000
CHEMOTHERAPY RECORDS: 2700
CHEMOTHERAPY RECORDS: 9
CHEMOTHERAPY RECORDS: NORMAL
CHEMOTHERAPY RX CANCER: NORMAL
CHEMOTHERAPY RX CANCER: NORMAL
DATE 1ST CHEMO CANCER: NORMAL
DATE 1ST CHEMO CANCER: NORMAL
RAD ONC ARIA COURSE LAST TREATMENT DATE: NORMAL
RAD ONC ARIA COURSE LAST TREATMENT DATE: NORMAL
RAD ONC ARIA COURSE TREATMENT ELAPSED DAYS: NORMAL
RAD ONC ARIA COURSE TREATMENT ELAPSED DAYS: NORMAL
RAD ONC ARIA REFERENCE POINT DOSAGE GIVEN TO DATE: 20
RAD ONC ARIA REFERENCE POINT DOSAGE GIVEN TO DATE: 24.35
RAD ONC ARIA REFERENCE POINT DOSAGE GIVEN TO DATE: 27
RAD ONC ARIA REFERENCE POINT DOSAGE GIVEN TO DATE: 27
RAD ONC ARIA REFERENCE POINT DOSAGE GIVEN TO DATE: 32.37
RAD ONC ARIA REFERENCE POINT DOSAGE GIVEN TO DATE: 32.37
RAD ONC ARIA REFERENCE POINT ID: NORMAL
RAD ONC ARIA REFERENCE POINT SESSION DOSAGE GIVEN: 10.78
RAD ONC ARIA REFERENCE POINT SESSION DOSAGE GIVEN: 9

## 2020-10-22 PROCEDURE — 77280 THER RAD SIMULAJ FIELD SMPL: CPT | Performed by: RADIOLOGY

## 2020-10-22 PROCEDURE — 77336 RADIATION PHYSICS CONSULT: CPT | Performed by: RADIOLOGY

## 2020-10-22 PROCEDURE — 77280 THER RAD SIMULAJ FIELD SMPL: CPT | Mod: 26 | Performed by: RADIOLOGY

## 2020-10-22 PROCEDURE — 77373 STRTCTC BDY RAD THER TX DLVR: CPT | Performed by: RADIOLOGY

## 2020-11-20 LAB
MYCOBACTERIUM SPEC CULT: NORMAL
RHODAMINE-AURAMINE STN SPEC: NORMAL
SIGNIFICANT IND 70042: NORMAL
SITE SITE: NORMAL
SOURCE SOURCE: NORMAL

## 2020-11-30 ENCOUNTER — HOSPITAL ENCOUNTER (OUTPATIENT)
Dept: RADIATION ONCOLOGY | Facility: MEDICAL CENTER | Age: 70
End: 2020-11-30
Attending: RADIOLOGY
Payer: COMMERCIAL

## 2020-11-30 ENCOUNTER — TELEPHONE (OUTPATIENT)
Dept: RADIATION ONCOLOGY | Facility: MEDICAL CENTER | Age: 70
End: 2020-11-30

## 2020-11-30 VITALS — WEIGHT: 192 LBS | BODY MASS INDEX: 27.49 KG/M2 | HEIGHT: 70 IN

## 2020-11-30 DIAGNOSIS — C79.31 BRAIN METASTASES: ICD-10-CM

## 2020-11-30 ASSESSMENT — FIBROSIS 4 INDEX: FIB4 SCORE: 0.93

## 2020-11-30 ASSESSMENT — PAIN SCALES - GENERAL: PAINLEVEL: NO PAIN

## 2020-11-30 NOTE — NON-PROVIDER
Patient was seen today through Virtual Visit with Dr. Crystal for follow up.  Vitals signs and weight were obtained and pain assessment was completed.  Allergies and medications were reviewed with the patient.  Review of systems completed.     Vitals/Pain:  There were no vitals filed for this visit.         Allergies:   Patient has no known allergies.    Current Medications:  Current Outpatient Medications   Medication Sig Dispense Refill   • gabapentin (NEURONTIN) 300 MG Cap Take 300 mg by mouth 4 times a day.     • oxyCODONE immediate-release (ROXICODONE) 5 MG Tab Take 10 mg by mouth every 3 hours as needed for Severe Pain.     • lisinopril (PRINIVIL) 10 MG Tab Take 10 mg by mouth 2 times a day.     • dexamethasone (DECADRON) 4 MG Tab Take 1 Tab by mouth every 8 hours. 45 Tab 0   • levETIRAcetam (KEPPRA) 500 MG Tab Take 1 Tab by mouth 2 Times a Day. 60 Tab 1     No current facility-administered medications for this encounter.          PCP:  James Borrero, Med Ass't

## 2020-11-30 NOTE — PROGRESS NOTES
Telemedicine Video Visit: Established Patient   This Remote Face to Face encounter was conducted via Zoom. Given the importance of social distancing and other strategies recommended to reduce the risk of COVID-19 transmission, I am providing medical care to this patient via audio/video visit in place of an in person visit at the request of the patient. Verbal consent to telehealth, risks, benefits, and consequences were discussed. Patient retains the right to withdraw at any time. All existing confidentiality protections apply. The patient has access to all transmitted medical information. No dissemination of any patient images or information to other entities without further written consent.  Subjective:   No chief complaint on file.      Juan M Hinkle is a 70 y.o. 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  Here for routine follow-up.    ROS   Denies any recent fevers or chills. No nausea or vomiting. No chest pains or shortness of breath.     No Known Allergies    Current medicines (including changes today)  Current Outpatient Medications   Medication Sig Dispense Refill   • gabapentin (NEURONTIN) 300 MG Cap Take 300 mg by mouth 4 times a day.     • oxyCODONE immediate-release (ROXICODONE) 5 MG Tab Take 10 mg by mouth every 3 hours as needed for Severe Pain.     • lisinopril (PRINIVIL) 10 MG Tab Take 10 mg by mouth 2 times a day.     • dexamethasone (DECADRON) 4 MG Tab Take 1 Tab by mouth every 8 hours. 45 Tab 0   • levETIRAcetam (KEPPRA) 500 MG Tab Take 1 Tab by mouth 2 Times a Day. 60 Tab 1     No current facility-administered medications for this encounter.        Patient Active Problem List    Diagnosis Date Noted   • Mass of left lung 09/23/2020     Priority: High   • Brain metastases (HCC) 09/22/2020     Priority: High   • Leukocytosis  "09/22/2020     Priority: Medium   • Hyponatremia 09/22/2020     Priority: Medium   • Tobacco abuse 09/22/2020     Priority: Low   • Alcohol abuse 09/22/2020     Priority: Low   • Hyperglycemia 09/22/2020     Priority: Low       History reviewed. No pertinent family history.    He  has no past medical history on file.  He  has no past surgical history on file.       Objective:   Vitals obtained by patient:  Ht 1.778 m (5' 10\")   Wt 87.1 kg (192 lb)   BMI 27.55 kg/m²     Physical Exam:Well-appearing in no apparent distress, Cranial nerves II through XII are intact  Constitutional: Alert, no distress, well-groomed.  Skin: No rashes in visible areas.  Eye: Round. Conjunctiva clear, lids normal. No icterus.   ENMT: Lips pink without lesions, good dentition, moist mucous membranes. Phonation normal.  Neck: No masses, no thyromegaly. Moves freely without pain.  CV: Pulse as reported by patient  Respiratory: Unlabored respiratory effort, no cough or audible wheeze  Psych: Alert and oriented x3, normal affect and mood.       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  Now on Keytruda and Yervoy      Follow-up: I will see patient in follow-up in a couple months with a repeat SRS MRI scan at that time.    Face to Face Video Visit:   I spent 15 minutes with patient/guardian and I conducted this visit with audio and video present.  Allegra Crystal M.D.       "

## 2021-01-29 ENCOUNTER — APPOINTMENT (OUTPATIENT)
Dept: RADIOLOGY | Facility: MEDICAL CENTER | Age: 71
End: 2021-01-29
Attending: RADIOLOGY
Payer: COMMERCIAL

## 2021-02-08 ENCOUNTER — HOSPITAL ENCOUNTER (OUTPATIENT)
Dept: RADIATION ONCOLOGY | Facility: MEDICAL CENTER | Age: 71
End: 2021-02-28
Attending: RADIOLOGY
Payer: COMMERCIAL

## 2021-02-08 PROCEDURE — 99442 PR PHYSICIAN TELEPHONE EVALUATION 11-20 MIN: CPT | Mod: 95,CR | Performed by: RADIOLOGY

## 2021-02-08 NOTE — PROGRESS NOTES
Telephone Appointment Visit   As a means of avoiding spread of COVID-19, this visit is being conducted by telephone. This telephone visit was initiated by the patient and they verbally consented.    Time at start of call: 11:50    Reason for Call:  Patient was unable to come in and was unable to figure out how to do a video visit.  Patient called because he seems more confused and would like to move his MRI scan of the brain up as well as his follow-up.  He is scheduled for one later this month.    HPI:    70-year-old male with history of metastatic70-year-old male with history of metastatic Adenocarcinoma of the lung metastatic to the brain status post stereotactic radiosurgery complete 10/22/2020 now on immunotherapy.Patient and his wife called today because they would like to move up his scan and move up his evaluation.Over the last few days he seems to have been a little bit more confused.    Labs / Images Reviewed:   None     Assessment and Plan:     Stage IV adenocarcinoma of the lung status post stereotactic radiosurgery to the brain complete 10/22/2020 now on immune therapy.    Follow-up: We will try to move the MRI scan up to this week and I can see him in follow-up after that.    Time at end of call: 12:02  Total Time Spent: 11-20 minutes    Allegra Crystal M.D.

## 2021-02-13 ENCOUNTER — APPOINTMENT (OUTPATIENT)
Dept: RADIOLOGY | Facility: MEDICAL CENTER | Age: 71
End: 2021-02-13
Attending: RADIOLOGY
Payer: COMMERCIAL

## 2021-02-16 ENCOUNTER — APPOINTMENT (OUTPATIENT)
Dept: RADIATION ONCOLOGY | Facility: MEDICAL CENTER | Age: 71
End: 2021-02-16
Attending: RADIOLOGY
Payer: COMMERCIAL

## 2021-02-17 ENCOUNTER — APPOINTMENT (OUTPATIENT)
Dept: RADIOLOGY | Facility: MEDICAL CENTER | Age: 71
End: 2021-02-17
Attending: RADIOLOGY
Payer: MEDICARE

## 2021-02-18 ENCOUNTER — HOSPITAL ENCOUNTER (OUTPATIENT)
Dept: RADIOLOGY | Facility: MEDICAL CENTER | Age: 71
End: 2021-02-18
Payer: COMMERCIAL

## 2021-02-20 ENCOUNTER — HOSPITAL ENCOUNTER (OUTPATIENT)
Dept: RADIOLOGY | Facility: MEDICAL CENTER | Age: 71
End: 2021-02-20
Attending: RADIOLOGY
Payer: COMMERCIAL

## 2021-02-20 DIAGNOSIS — C79.31 BRAIN METASTASES: ICD-10-CM

## 2021-02-20 PROCEDURE — 700117 HCHG RX CONTRAST REV CODE 255: Performed by: RADIOLOGY

## 2021-02-20 PROCEDURE — A9576 INJ PROHANCE MULTIPACK: HCPCS | Performed by: RADIOLOGY

## 2021-02-20 PROCEDURE — 70553 MRI BRAIN STEM W/O & W/DYE: CPT

## 2021-02-20 RX ADMIN — GADOTERIDOL 15 ML: 279.3 INJECTION, SOLUTION INTRAVENOUS at 17:58

## 2021-02-22 VITALS
HEIGHT: 70 IN | TEMPERATURE: 97.5 F | SYSTOLIC BLOOD PRESSURE: 125 MMHG | HEART RATE: 86 BPM | DIASTOLIC BLOOD PRESSURE: 89 MMHG | WEIGHT: 191.8 LBS | BODY MASS INDEX: 27.46 KG/M2 | OXYGEN SATURATION: 96 %

## 2021-02-22 DIAGNOSIS — C79.31 BRAIN METASTASES: ICD-10-CM

## 2021-02-22 PROCEDURE — 99214 OFFICE O/P EST MOD 30 MIN: CPT | Performed by: RADIOLOGY

## 2021-02-22 PROCEDURE — 99212 OFFICE O/P EST SF 10 MIN: CPT | Performed by: RADIOLOGY

## 2021-02-22 RX ORDER — DEXAMETHASONE 2 MG/1
2 TABLET ORAL DAILY
COMMUNITY

## 2021-02-22 RX ORDER — TAMSULOSIN HYDROCHLORIDE 0.4 MG/1
0.4 CAPSULE ORAL
COMMUNITY
Start: 2020-12-23

## 2021-02-22 ASSESSMENT — FIBROSIS 4 INDEX: FIB4 SCORE: 0.93

## 2021-02-22 ASSESSMENT — PAIN SCALES - GENERAL: PAINLEVEL: NO PAIN

## 2021-02-22 NOTE — PROGRESS NOTES
RADIATION ONCOLOGY FOLLOW-UP    DATE OF SERVICE: 2/22/2021    IDENTIFICATION:   70-year-old male with history of metastatic70-year-old male with history of metastatic Adenocarcinoma of the lung metastatic to the brain status post stereotactic radiosurgery complete 10/22/2020 now on immunotherapy.Here to review results of most recent MRI scan of the brain.    HISTORY OF PRESENT ILLNESS:   Since last seen patient had had some shaky episodes.  Dr. Koo had Given him some dexamethasone 2 mg a day for a week and that seems to be helping his symptoms.  He is also being referred to endocrinologist Dr. Oscar Crystal in [place] as well as a cardiology because on the recent CT scan he was noted to have fluid in the pericardial region and bilateral pleural effusions.He also had a recent MRI scan which we moved up because of his symptoms and he is here to review the results of that today.  The MRI scan shows a significant interval decrease in the size of the 2 largest previously demonstrated lesions in the right posterior temporal lobe and left parietal lobe.  The lesions have decreased from  from 17-10 and 21-11 respectively.  The prior parasagittal tiny enhancing nodule is no longer visual visualized.  There are no new metastatic lesions.He is continuing on immune therapy.    CURRENT MEDICATIONS:  Current Outpatient Medications   Medication Sig Dispense Refill   • tamsulosin (FLOMAX) 0.4 MG capsule Take 0.4 mg by mouth every day.     • dexamethasone (DECADRON) 2 MG tablet Take 2 mg by mouth every day.     • gabapentin (NEURONTIN) 300 MG Cap Take 300 mg by mouth 4 times a day.     • oxyCODONE immediate-release (ROXICODONE) 5 MG Tab Take 10 mg by mouth every 3 hours as needed for Severe Pain.     • lisinopril (PRINIVIL) 10 MG Tab Take 10 mg by mouth 2 times a day.     • dexamethasone (DECADRON) 4 MG Tab Take 1 Tab by mouth every 8 hours. 45 Tab 0   • levETIRAcetam (KEPPRA) 500 MG Tab Take 1 Tab by mouth 2 Times a Day. 60 Tab  "1     No current facility-administered medications for this encounter.       ALLERGIES:  Patient has no known allergies.    FAMILY HISTORY:    No family history on file.[unfilled]        SOCIAL HISTORY:     reports that he quit smoking about 4 months ago. He smoked 1.50 packs per day. He has never used smokeless tobacco. He reports current alcohol use. He reports that he does not use drugs.        REVIEW OF SYSTEMS: Is significant for Bleeding from the mouth occasionally constipation memory loss  The rest of the review of systems has been reviewed.    PHYSICAL EXAM:     ECOG PERFORMANCE STATUS:  0= Fully active, able to carry on all pre-disease performance without restriction.   Vitals:    02/22/21 0954   BP: 125/89   BP Location: Right arm   Patient Position: Sitting   BP Cuff Size: Adult   Pulse: 86   Temp: 36.4 °C (97.5 °F)   TempSrc: Temporal   SpO2: 96%   Weight: 87 kg (191 lb 12.8 oz)   Height: 1.778 m (5' 10\")   Pain Score: No pain        GENERAL: Well-appearing alert and oriented x3 no apparent distress  HEENT:  Pupils are equal, round, and reactive to light.  Extraocular muscles   are intact. Sclerae nonicteric.  Conjunctivae pink.  Oral cavity, tongue   protrudes midline.   NECK:  No preauricular neck supraclavicular axillary adenopathy.  LUNGS:  Clear to ascultation   HEART:  Regular rate and rhythm.  No murmur appreciated  ABDOMEN:  Soft. No evidence of hepatosplenomegaly.   EXTREMITIES:  Without Edema.  NEUROLOGIC:  Cranial nerves II through XII were intact. Normal stance and gait motor and sensory grossly within normal limits          IMPRESSION:    70-year-old male with history of metastatic70-year-old male with history of metastatic Adenocarcinoma of the lung metastatic to the brain status post stereotactic radiosurgery complete 10/22/2020 now on immunotherapy.    RECOMMENDATIONS:     Patient has had excellent regression of tumors in his brain.  I am going to repeat an MRI scan of the brain in 3 " months and see him in follow-up after that.  He is can continue his systemic management with Dr. Koo.        Thank you for the opportunity to participate in his care.  If any questions or comments, please do not hesitate in calling.      Please note that this dictation was created using voice recognition software. I have made every reasonable attempt to correct obvious errors, but I expect that there are errors of grammar and possibly content that I did not discover before finalizing the note.

## 2021-02-22 NOTE — NON-PROVIDER
"Patient was seen today in clinic with Dr. Crystal for follow up.  Vitals signs and weight were obtained and pain assessment was completed.  Allergies and medications were reviewed with the patient.  Toxicities of treatment assessed.     Vitals/Pain:  Vitals:    02/22/21 0954   BP: 125/89   BP Location: Right arm   Patient Position: Sitting   BP Cuff Size: Adult   Pulse: 86   Temp: 36.4 °C (97.5 °F)   TempSrc: Temporal   SpO2: 96%   Weight: 87 kg (191 lb 12.8 oz)   Height: 1.778 m (5' 10\")   Pain Score: No pain        Allergies:   Patient has no known allergies.    Current Medications:  Current Outpatient Medications   Medication Sig Dispense Refill   • tamsulosin (FLOMAX) 0.4 MG capsule Take 0.4 mg by mouth every day.     • dexamethasone (DECADRON) 2 MG tablet Take 2 mg by mouth every day.     • gabapentin (NEURONTIN) 300 MG Cap Take 300 mg by mouth 4 times a day.     • oxyCODONE immediate-release (ROXICODONE) 5 MG Tab Take 10 mg by mouth every 3 hours as needed for Severe Pain.     • lisinopril (PRINIVIL) 10 MG Tab Take 10 mg by mouth 2 times a day.     • dexamethasone (DECADRON) 4 MG Tab Take 1 Tab by mouth every 8 hours. 45 Tab 0   • levETIRAcetam (KEPPRA) 500 MG Tab Take 1 Tab by mouth 2 Times a Day. 60 Tab 1     No current facility-administered medications for this encounter.           Samantha Borrero, Med Ass't  "

## 2021-05-14 ENCOUNTER — HOSPITAL ENCOUNTER (OUTPATIENT)
Dept: RADIOLOGY | Facility: MEDICAL CENTER | Age: 71
End: 2021-05-14
Attending: RADIOLOGY
Payer: COMMERCIAL

## 2021-05-14 ENCOUNTER — HOSPITAL ENCOUNTER (OUTPATIENT)
Dept: RADIOLOGY | Facility: MEDICAL CENTER | Age: 71
End: 2021-05-14
Attending: RADIOLOGY
Payer: MEDICARE

## 2021-05-14 DIAGNOSIS — C79.31 BRAIN METASTASES: ICD-10-CM

## 2021-05-14 PROCEDURE — 700117 HCHG RX CONTRAST REV CODE 255: Performed by: RADIOLOGY

## 2021-05-14 PROCEDURE — A9576 INJ PROHANCE MULTIPACK: HCPCS | Performed by: RADIOLOGY

## 2021-05-14 PROCEDURE — 70553 MRI BRAIN STEM W/O & W/DYE: CPT | Mod: ME

## 2021-05-14 RX ADMIN — GADOTERIDOL 18 ML: 279.3 INJECTION, SOLUTION INTRAVENOUS at 11:00

## 2021-05-17 ENCOUNTER — HOSPITAL ENCOUNTER (OUTPATIENT)
Dept: RADIATION ONCOLOGY | Facility: MEDICAL CENTER | Age: 71
End: 2021-05-31
Attending: RADIOLOGY
Payer: COMMERCIAL

## 2021-05-17 VITALS
DIASTOLIC BLOOD PRESSURE: 71 MMHG | HEIGHT: 66 IN | HEART RATE: 84 BPM | SYSTOLIC BLOOD PRESSURE: 123 MMHG | BODY MASS INDEX: 30.79 KG/M2 | OXYGEN SATURATION: 96 % | TEMPERATURE: 97.9 F | WEIGHT: 191.58 LBS

## 2021-05-17 DIAGNOSIS — C79.31 BRAIN METASTASES: ICD-10-CM

## 2021-05-17 PROCEDURE — 99212 OFFICE O/P EST SF 10 MIN: CPT | Performed by: RADIOLOGY

## 2021-05-17 PROCEDURE — 99214 OFFICE O/P EST MOD 30 MIN: CPT | Performed by: RADIOLOGY

## 2021-05-17 RX ORDER — HYDROXYZINE HYDROCHLORIDE 25 MG/1
TABLET, FILM COATED ORAL
COMMUNITY
Start: 2021-05-10

## 2021-05-17 RX ORDER — TRIAMCINOLONE ACETONIDE 1 MG/G
CREAM TOPICAL 2 TIMES DAILY
COMMUNITY

## 2021-05-17 RX ORDER — HYDROCORTISONE 10 MG/1
TABLET ORAL
COMMUNITY
Start: 2021-05-04

## 2021-05-17 RX ORDER — LEVOTHYROXINE SODIUM 150 MCG
TABLET ORAL
COMMUNITY
Start: 2021-04-29

## 2021-05-17 ASSESSMENT — PAIN SCALES - GENERAL: PAINLEVEL: NO PAIN

## 2021-05-17 ASSESSMENT — FIBROSIS 4 INDEX: FIB4 SCORE: 0.93

## 2021-05-17 NOTE — NON-PROVIDER
"Patient was seen today in clinic with Dr. Crystal for follow up.  Vitals signs and weight were obtained and pain assessment was completed.  Allergies and medications were reviewed with the patient.  Toxicities of treatment assessed.     Vitals/Pain:  Vitals:    05/17/21 1032   BP: 123/71   BP Location: Left arm   Patient Position: Sitting   BP Cuff Size: Adult   Pulse: 84   Temp: 36.6 °C (97.9 °F)   TempSrc: Temporal   SpO2: 96%   Weight: 86.9 kg (191 lb 9.3 oz)   Height: 1.676 m (5' 6\")   Pain Score: No pain        Allergies:   Patient has no known allergies.    Current Medications:  Current Outpatient Medications   Medication Sig Dispense Refill   • hydrocortisone (CORTEF) 10 MG Tab 1 TABLET TAKING 3 X DAILY ORALLY 30 DAY(S)     • SYNTHROID 150 MCG Tab 1 TABLET ONCE A DAY         DISPENSE AS WRITTEN ORALLY 30 DAY(S)     • hydrOXYzine HCl (ATARAX) 25 MG Tab      • triamcinolone acetonide (KENALOG) 0.1 % Cream Apply  topically 2 times a day.     • Nivolumab (OPDIVO IV) Infuse  into a venous catheter.     • Ipilimumab (YERVOY IV) Infuse  into a venous catheter.     • tamsulosin (FLOMAX) 0.4 MG capsule Take 0.4 mg by mouth every day.     • gabapentin (NEURONTIN) 300 MG Cap Take 300 mg by mouth 4 times a day.     • oxyCODONE immediate-release (ROXICODONE) 5 MG Tab Take 10 mg by mouth every 3 hours as needed for Severe Pain.     • dexamethasone (DECADRON) 2 MG tablet Take 2 mg by mouth every day. (Patient not taking: Reported on 5/17/2021)     • dexamethasone (DECADRON) 4 MG Tab Take 1 Tab by mouth every 8 hours. 45 Tab 0   • levETIRAcetam (KEPPRA) 500 MG Tab Take 1 Tab by mouth 2 Times a Day. 60 Tab 1   • lisinopril (PRINIVIL) 10 MG Tab Take 10 mg by mouth 2 times a day. (Patient not taking: Reported on 5/17/2021)       No current facility-administered medications for this encounter.           Samantha Borrero, Med Ass't    "

## 2021-05-17 NOTE — PROGRESS NOTES
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    HISTORY OF PRESENT ILLNESS:   Since last seen patient has been doing well he is continuing on Opdivo and Yervoy.  He has had a little bit of pruritus issues from it but the hydrocortisone really seems to be helping orally and he is also using topical steroids as well.  From a brain standpoint he is really having no problems just here to review the most recent results of the MRI scan.  The MRI scan was done on 5/14/2021 there are focal enhancing lesions in the right posterior temporal and left parietal lobes consistent with stable metastasis.  There is been no significant interval change and there are no new lesions.    CURRENT MEDICATIONS:  Current Outpatient Medications   Medication Sig Dispense Refill   • hydrocortisone (CORTEF) 10 MG Tab 1 TABLET TAKING 3 X DAILY ORALLY 30 DAY(S)     • SYNTHROID 150 MCG Tab 1 TABLET ONCE A DAY         DISPENSE AS WRITTEN ORALLY 30 DAY(S)     • hydrOXYzine HCl (ATARAX) 25 MG Tab      • triamcinolone acetonide (KENALOG) 0.1 % Cream Apply  topically 2 times a day.     • Nivolumab (OPDIVO IV) Infuse  into a venous catheter.     • Ipilimumab (YERVOY IV) Infuse  into a venous catheter.     • tamsulosin (FLOMAX) 0.4 MG capsule Take 0.4 mg by mouth every day.     • gabapentin (NEURONTIN) 300 MG Cap Take 300 mg by mouth 4 times a day.     • oxyCODONE immediate-release (ROXICODONE) 5 MG Tab Take 10 mg by mouth every 3 hours as needed for Severe Pain.     • dexamethasone (DECADRON) 2 MG tablet Take 2 mg by mouth every day. (Patient not taking: Reported on 5/17/2021)     • dexamethasone (DECADRON) 4 MG Tab Take 1 Tab by mouth every 8  "hours. 45 Tab 0   • levETIRAcetam (KEPPRA) 500 MG Tab Take 1 Tab by mouth 2 Times a Day. 60 Tab 1   • lisinopril (PRINIVIL) 10 MG Tab Take 10 mg by mouth 2 times a day. (Patient not taking: Reported on 5/17/2021)       No current facility-administered medications for this encounter.       ALLERGIES:  Patient has no known allergies.    FAMILY HISTORY:    No family history on file.[unfilled]        SOCIAL HISTORY:     reports that he quit smoking about 7 months ago. He smoked 1.50 packs per day. He has never used smokeless tobacco. He reports current alcohol use. He reports that he does not use drugs.        REVIEW OF SYSTEMS: Is significant for that described in the HPI  The rest of the review of systems has been reviewed.    PHYSICAL EXAM:     ECOG PERFORMANCE STATUS:  0= Fully active, able to carry on all pre-disease performance without restriction.   Vitals:    05/17/21 1032   BP: 123/71   BP Location: Left arm   Patient Position: Sitting   BP Cuff Size: Adult   Pulse: 84   Temp: 36.6 °C (97.9 °F)   TempSrc: Temporal   SpO2: 96%   Weight: 86.9 kg (191 lb 9.3 oz)   Height: 1.676 m (5' 6\")   Pain Score: No pain        GENERAL: Well-appearing alert and oriented x3 in no apparent distress  HEENT:  Pupils are equal, round, and reactive to light.  Extraocular muscles   are intact. Sclerae nonicteric.  Conjunctivae pink.  Oral cavity, tongue   protrudes midline.   NECK:  No preauricular neck supraclavicular axillary adenopathy.  LUNGS:  Clear to ascultation   HEART:  Regular rate and rhythm.  No murmur appreciated  ABDOMEN:  Soft. No evidence of hepatosplenomegaly.   EXTREMITIES:  Without Edema.  NEUROLOGIC:  Cranial nerves II through XII were intact. 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  RECOMMENDATIONS:     We are going to repeat an MRI scan of the brain in 3 months and I will see him in follow-up at that time.      Thank you for the opportunity to participate in his care.  If any questions or comments, please do not hesitate in calling.      Please note that this dictation was created using voice recognition software. I have made every reasonable attempt to correct obvious errors, but I expect that there are errors of grammar and possibly content that I did not discover before finalizing the note.

## 2021-08-17 ENCOUNTER — HOSPITAL ENCOUNTER (OUTPATIENT)
Dept: RADIOLOGY | Facility: MEDICAL CENTER | Age: 71
End: 2021-08-17
Attending: RADIOLOGY
Payer: COMMERCIAL

## 2021-08-17 DIAGNOSIS — C79.31 BRAIN METASTASES: ICD-10-CM

## 2021-08-20 ENCOUNTER — HOSPITAL ENCOUNTER (OUTPATIENT)
Dept: RADIOLOGY | Facility: MEDICAL CENTER | Age: 71
End: 2021-08-20
Attending: RADIOLOGY
Payer: COMMERCIAL

## 2021-08-20 PROCEDURE — 70553 MRI BRAIN STEM W/O & W/DYE: CPT

## 2021-08-20 PROCEDURE — A9576 INJ PROHANCE MULTIPACK: HCPCS | Performed by: RADIOLOGY

## 2021-08-20 PROCEDURE — 700117 HCHG RX CONTRAST REV CODE 255: Performed by: RADIOLOGY

## 2021-08-20 RX ADMIN — GADOTERIDOL 18 ML: 279.3 INJECTION, SOLUTION INTRAVENOUS at 16:30

## 2021-08-24 ENCOUNTER — APPOINTMENT (OUTPATIENT)
Dept: RADIATION ONCOLOGY | Facility: MEDICAL CENTER | Age: 71
End: 2021-08-24
Attending: RADIOLOGY
Payer: COMMERCIAL

## 2021-08-24 ENCOUNTER — HOSPITAL ENCOUNTER (OUTPATIENT)
Dept: RADIATION ONCOLOGY | Facility: MEDICAL CENTER | Age: 71
End: 2021-08-31
Attending: RADIOLOGY
Payer: COMMERCIAL

## 2021-08-24 VITALS — WEIGHT: 199 LBS | HEIGHT: 67 IN | BODY MASS INDEX: 31.23 KG/M2

## 2021-08-24 DIAGNOSIS — C79.31 BRAIN METASTASES: ICD-10-CM

## 2021-08-24 PROCEDURE — 99213 OFFICE O/P EST LOW 20 MIN: CPT | Mod: 95 | Performed by: RADIOLOGY

## 2021-08-24 ASSESSMENT — PAIN SCALES - GENERAL: PAINLEVEL: NO PAIN

## 2021-08-24 ASSESSMENT — FIBROSIS 4 INDEX: FIB4 SCORE: 0.94

## 2021-08-24 NOTE — NON-PROVIDER
Patient was seen today through Virtual Visit with Dr. Crystal for follow up.  Vitals signs and weight were obtained and pain assessment was completed.  Allergies and medications were reviewed with the patient.  Toxicities of treatment assessed.     Vitals/Pain:  There were no vitals filed for this visit.         Allergies:   Patient has no known allergies.    Current Medications:  Current Outpatient Medications   Medication Sig Dispense Refill   • hydrocortisone (CORTEF) 10 MG Tab 1 TABLET TAKING 3 X DAILY ORALLY 30 DAY(S)     • SYNTHROID 150 MCG Tab 1 TABLET ONCE A DAY         DISPENSE AS WRITTEN ORALLY 30 DAY(S)     • hydrOXYzine HCl (ATARAX) 25 MG Tab      • triamcinolone acetonide (KENALOG) 0.1 % Cream Apply  topically 2 times a day.     • Nivolumab (OPDIVO IV) Infuse  into a venous catheter.     • Ipilimumab (YERVOY IV) Infuse  into a venous catheter.     • tamsulosin (FLOMAX) 0.4 MG capsule Take 0.4 mg by mouth every day.     • dexamethasone (DECADRON) 2 MG tablet Take 2 mg by mouth every day. (Patient not taking: Reported on 5/17/2021)     • dexamethasone (DECADRON) 4 MG Tab Take 1 Tab by mouth every 8 hours. 45 Tab 0   • levETIRAcetam (KEPPRA) 500 MG Tab Take 1 Tab by mouth 2 Times a Day. 60 Tab 1   • gabapentin (NEURONTIN) 300 MG Cap Take 300 mg by mouth 4 times a day.     • oxyCODONE immediate-release (ROXICODONE) 5 MG Tab Take 10 mg by mouth every 3 hours as needed for Severe Pain.     • lisinopril (PRINIVIL) 10 MG Tab Take 10 mg by mouth 2 times a day. (Patient not taking: Reported on 5/17/2021)       No current facility-administered medications for this encounter.           Samantha Borrero, Med Ass't

## 2021-08-24 NOTE — PROGRESS NOTES
Telemedicine Video Visit: Established Patient   This Remote Face to Face encounter was conducted via Zoom. Given the importance of social distancing and other strategies recommended to reduce the risk of COVID-19 transmission, I am providing medical care to this patient via audio/video visit in place of an in person visit at the request of the patient. Verbal consent to telehealth, risks, benefits, and consequences were discussed. Patient retains the right to withdraw at any time. All existing confidentiality protections apply. The patient has access to all transmitted medical information. No dissemination of any patient images or information to other entities without further written consent.  Subjective:     Chief Complaint   Patient presents with   • Cancer     follow up Luis Armando Hinkle is a 71-year-old male with history of metastatic70-year-old male with history of metastatic Adenocarcinoma of the lung metastatic to the brain status post stereotactic radiosurgery complete 10/22/2020 now on immunotherapy.Here to review results of most recent MRI scan of the brain    Patient is doing well without any major complaints.  MRI scan of the brain done 8/20/2021 shows stable appearance of the left parietal and right occipital nodular lesions.  There is nonspecific white matter changes related to chronic microvascular ischemia.    ROS Only complaint is fatigue denies any headaches or neurologic problems  Denies any recent fevers or chills. No nausea or vomiting. No chest pains or shortness of breath.     No Known Allergies    Current medicines (including changes today)  Current Outpatient Medications   Medication Sig Dispense Refill   • hydrocortisone (CORTEF) 10 MG Tab 1 TABLET TAKING 3 X DAILY ORALLY 30 DAY(S)     • SYNTHROID 150 MCG Tab 1 TABLET ONCE A DAY         DISPENSE AS WRITTEN ORALLY 30 DAY(S)     • hydrOXYzine HCl (ATARAX) 25 MG Tab      • triamcinolone acetonide (KENALOG) 0.1 % Cream Apply   "topically 2 times a day.     • Nivolumab (OPDIVO IV) Infuse  into a venous catheter.     • Ipilimumab (YERVOY IV) Infuse  into a venous catheter.     • tamsulosin (FLOMAX) 0.4 MG capsule Take 0.4 mg by mouth every day.     • dexamethasone (DECADRON) 2 MG tablet Take 2 mg by mouth every day. (Patient not taking: Reported on 5/17/2021)     • dexamethasone (DECADRON) 4 MG Tab Take 1 Tab by mouth every 8 hours. 45 Tab 0   • levETIRAcetam (KEPPRA) 500 MG Tab Take 1 Tab by mouth 2 Times a Day. 60 Tab 1   • gabapentin (NEURONTIN) 300 MG Cap Take 300 mg by mouth 4 times a day.     • oxyCODONE immediate-release (ROXICODONE) 5 MG Tab Take 10 mg by mouth every 3 hours as needed for Severe Pain.     • lisinopril (PRINIVIL) 10 MG Tab Take 10 mg by mouth 2 times a day. (Patient not taking: Reported on 5/17/2021)       No current facility-administered medications for this encounter.       Patient Active Problem List    Diagnosis Date Noted   • Mass of left lung 09/23/2020   • Brain metastases (HCC) 09/22/2020   • Tobacco abuse 09/22/2020   • Alcohol abuse 09/22/2020   • Leukocytosis 09/22/2020   • Hyperglycemia 09/22/2020   • Hyponatremia 09/22/2020       History reviewed. No pertinent family history.    He  has no past medical history on file.  He  has no past surgical history on file.       Objective:   Vitals obtained by patient:  Ht 1.702 m (5' 7\")   Wt 90.3 kg (199 lb)   BMI 31.17 kg/m²     Physical Exam:Alert oriented x3 in no apparent distress  Constitutional: Alert, no distress, well-groomed.  Skin: No rashes in visible areas.  Eye: Round. Conjunctiva clear, lids normal. No icterus.   ENMT: Lips pink without lesions, good dentition, moist mucous membranes. Phonation normal.  Neck: No masses, no thyromegaly. Moves freely without pain.  CV: Pulse as reported by patient  Respiratory: Unlabored respiratory effort, no cough or audible wheeze  Psych: Alert and oriented x3, normal affect and mood.       Assessment and Plan: "   The following treatment plan was discussed:     Metastatic adenocarcinoma of the lung to the brain now on immunotherapy status post SRS to the brain complete 10/22/2020.  Continuing on Opdivo and Yervoy also seeing endocrinologist for treatment of hypothyroidism and hypoadrenalism.    Follow-up: Patient will see me in follow-up in 3 months time with a repeat SRS MRI  of the brain    Face to Face Video Visit:   I spent 19 minutes with patient/guardian and I conducted this visit with audio and video present. 2:08-2:27  Allegra Crystal M.D.

## 2021-11-24 ENCOUNTER — HOSPITAL ENCOUNTER (OUTPATIENT)
Dept: RADIOLOGY | Facility: MEDICAL CENTER | Age: 71
End: 2021-11-24
Attending: RADIOLOGY
Payer: COMMERCIAL

## 2021-11-24 DIAGNOSIS — C79.31 BRAIN METASTASES: ICD-10-CM

## 2021-11-24 PROCEDURE — 700117 HCHG RX CONTRAST REV CODE 255: Performed by: RADIOLOGY

## 2021-11-24 PROCEDURE — 70553 MRI BRAIN STEM W/O & W/DYE: CPT

## 2021-11-24 PROCEDURE — A9576 INJ PROHANCE MULTIPACK: HCPCS | Performed by: RADIOLOGY

## 2021-11-24 RX ADMIN — GADOTERIDOL 20 ML: 279.3 INJECTION, SOLUTION INTRAVENOUS at 10:12

## 2021-11-29 ENCOUNTER — HOSPITAL ENCOUNTER (OUTPATIENT)
Dept: RADIATION ONCOLOGY | Facility: MEDICAL CENTER | Age: 71
End: 2021-11-30
Attending: RADIOLOGY
Payer: COMMERCIAL

## 2021-11-29 VITALS
OXYGEN SATURATION: 95 % | SYSTOLIC BLOOD PRESSURE: 118 MMHG | TEMPERATURE: 98.4 F | DIASTOLIC BLOOD PRESSURE: 76 MMHG | HEART RATE: 95 BPM

## 2021-11-29 DIAGNOSIS — C79.31 BRAIN METASTASES: ICD-10-CM

## 2021-11-29 PROCEDURE — 99214 OFFICE O/P EST MOD 30 MIN: CPT | Performed by: RADIOLOGY

## 2021-11-29 PROCEDURE — 99212 OFFICE O/P EST SF 10 MIN: CPT | Performed by: RADIOLOGY

## 2021-11-29 RX ORDER — TIZANIDINE 2 MG/1
TABLET ORAL
COMMUNITY
Start: 2021-09-14

## 2021-11-29 ASSESSMENT — PAIN SCALES - GENERAL: PAINLEVEL: NO PAIN

## 2021-11-29 NOTE — PROGRESS NOTES
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  MRI scan done 11/24/2021 shows stable metastasis near the gray-white junction in the right occipital and left parietal lobes with surrounding vasogenic edema.  Age-related atrophy.  Mild to moderate periventricular juxtacortical and pontine white matter changes consistent with chronic microvascular ischemic gliosis.  He is continuing on Yervoy and Opdivo with Dr. Koo.    CURRENT MEDICATIONS:  Current Outpatient Medications   Medication Sig Dispense Refill   • tizanidine (ZANAFLEX) 2 MG tablet      • hydrocortisone (CORTEF) 10 MG Tab 1 TABLET TAKING 3 X DAILY ORALLY 30 DAY(S)     • SYNTHROID 150 MCG Tab 1 TABLET ONCE A DAY         DISPENSE AS WRITTEN ORALLY 30 DAY(S)     • hydrOXYzine HCl (ATARAX) 25 MG Tab      • triamcinolone acetonide (KENALOG) 0.1 % Cream Apply  topically 2 times a day.     • Nivolumab (OPDIVO IV) Infuse  into a venous catheter.     • Ipilimumab (YERVOY IV) Infuse  into a venous catheter.     • tamsulosin (FLOMAX) 0.4 MG capsule Take 0.4 mg by mouth every day.     • dexamethasone (DECADRON) 2 MG tablet Take 2 mg by mouth every day. (Patient not taking: Reported on 5/17/2021)     • dexamethasone (DECADRON) 4 MG Tab Take 1 Tab by mouth every 8 hours. 45 Tab 0   • levETIRAcetam (KEPPRA) 500 MG Tab Take 1 Tab by mouth 2 Times a Day. 60 Tab 1   • gabapentin (NEURONTIN) 300 MG Cap Take 300 mg by mouth 4 times a day.     • oxyCODONE immediate-release (ROXICODONE) 5 MG Tab Take 10 mg by mouth every 3 hours as needed for Severe Pain.     • lisinopril  (PRINIVIL) 10 MG Tab Take 10 mg by mouth 2 times a day. (Patient not taking: Reported on 5/17/2021)       No current facility-administered medications for this encounter.       ALLERGIES:  Patient has no known allergies.    FAMILY HISTORY:    No family history on file.[unfilled]        SOCIAL HISTORY:     reports that he quit smoking about 13 months ago. He smoked 1.50 packs per day. He has never used smokeless tobacco. He reports current alcohol use. He reports that he does not use drugs.    PAIN: Patient has no pain    REVIEW OF SYSTEMS: Is significant for Nothing  The rest of the review of systems has been reviewed.    PHYSICAL EXAM:     ECOG PERFORMANCE STATUS:  0= Fully active, able to carry on all pre-disease performance without restriction.   Vitals:    11/29/21 1119   BP: 118/76   Pulse: 95   Temp: 36.9 °C (98.4 °F)   SpO2: 95%   Pain Score: No pain        GENERAL: Alert oriented x3 in no apparent distress  HEENT:  Pupils are equal, round, and reactive to light.  Extraocular muscles   are intact. Sclerae nonicteric.  Conjunctivae pink.  Oral cavity, tongue   protrudes midline.   NECK:  No preauricular neck supraclavicular axillary adenopathy.  LUNGS:  Clear to ascultation   HEART:  Regular rate and rhythm.  No murmur appreciated  ABDOMEN:  Soft. No evidence of hepatosplenomegaly.   EXTREMITIES:  Without Edema.  NEUROLOGIC:  Cranial nerves II through XII were intact. 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  I am happy to see him on an as-needed basis as well      Thank you for the opportunity to  participate in his care.  If any questions or comments, please do not hesitate in calling.      Please note that this dictation was created using voice recognition software. I have made every reasonable attempt to correct obvious errors, but I expect that there are errors of grammar and possibly content that I did not discover before finalizing the note.

## 2021-11-29 NOTE — NON-PROVIDER
Patient was seen today in clinic with Dr. Crystal for Follow up.  Vitals signs and weight were obtained and pain assessment was completed.  Allergies and medications were reviewed with the patient.  Toxicities of treatment assessed.     Vitals/Pain:  Vitals:    11/29/21 1119   BP: 118/76   Pulse: 95   Temp: 36.9 °C (98.4 °F)   SpO2: 95%   Pain Score: No pain        Allergies:   Patient has no known allergies.    Current Medications:  Current Outpatient Medications   Medication Sig Dispense Refill   • tizanidine (ZANAFLEX) 2 MG tablet      • hydrocortisone (CORTEF) 10 MG Tab 1 TABLET TAKING 3 X DAILY ORALLY 30 DAY(S)     • SYNTHROID 150 MCG Tab 1 TABLET ONCE A DAY         DISPENSE AS WRITTEN ORALLY 30 DAY(S)     • hydrOXYzine HCl (ATARAX) 25 MG Tab      • triamcinolone acetonide (KENALOG) 0.1 % Cream Apply  topically 2 times a day.     • Nivolumab (OPDIVO IV) Infuse  into a venous catheter.     • Ipilimumab (YERVOY IV) Infuse  into a venous catheter.     • tamsulosin (FLOMAX) 0.4 MG capsule Take 0.4 mg by mouth every day.     • dexamethasone (DECADRON) 2 MG tablet Take 2 mg by mouth every day. (Patient not taking: Reported on 5/17/2021)     • dexamethasone (DECADRON) 4 MG Tab Take 1 Tab by mouth every 8 hours. 45 Tab 0   • levETIRAcetam (KEPPRA) 500 MG Tab Take 1 Tab by mouth 2 Times a Day. 60 Tab 1   • gabapentin (NEURONTIN) 300 MG Cap Take 300 mg by mouth 4 times a day.     • oxyCODONE immediate-release (ROXICODONE) 5 MG Tab Take 10 mg by mouth every 3 hours as needed for Severe Pain.     • lisinopril (PRINIVIL) 10 MG Tab Take 10 mg by mouth 2 times a day. (Patient not taking: Reported on 5/17/2021)       No current facility-administered medications for this encounter.         PCP:  [facility]

## 2022-03-29 ENCOUNTER — HOSPITAL ENCOUNTER (OUTPATIENT)
Dept: RADIOLOGY | Facility: MEDICAL CENTER | Age: 72
End: 2022-03-29
Attending: RADIOLOGY
Payer: COMMERCIAL

## 2022-03-29 DIAGNOSIS — E06.3 HASHIMOTO'S DISEASE: ICD-10-CM

## 2022-03-29 DIAGNOSIS — C79.31 BRAIN METASTASES: ICD-10-CM

## 2022-03-29 DIAGNOSIS — E03.2 IATROGENIC HYPOTHYROIDISM: ICD-10-CM

## 2022-03-29 PROCEDURE — 700117 HCHG RX CONTRAST REV CODE 255: Performed by: RADIOLOGY

## 2022-03-29 PROCEDURE — A9576 INJ PROHANCE MULTIPACK: HCPCS | Performed by: RADIOLOGY

## 2022-03-29 PROCEDURE — 70553 MRI BRAIN STEM W/O & W/DYE: CPT

## 2022-03-29 RX ADMIN — GADOTERIDOL 18 ML: 279.3 INJECTION, SOLUTION INTRAVENOUS at 13:19

## 2022-04-01 ENCOUNTER — HOSPITAL ENCOUNTER (OUTPATIENT)
Dept: RADIATION ONCOLOGY | Facility: MEDICAL CENTER | Age: 72
End: 2022-04-30
Attending: RADIOLOGY
Payer: COMMERCIAL

## 2022-04-01 DIAGNOSIS — C79.31 BRAIN METASTASES: ICD-10-CM

## 2022-04-01 PROCEDURE — 99213 OFFICE O/P EST LOW 20 MIN: CPT | Mod: 95 | Performed by: RADIOLOGY

## 2022-04-01 NOTE — PROGRESS NOTES
Telemedicine Video Visit: Established Patient   This Remote Face to Face encounter was conducted via Zoom. Given the importance of social distancing and other strategies recommended to reduce the risk of COVID-19 transmission, I am providing medical care to this patient via audio/video visit in place of an in person visit at the request of the patient. Verbal consent to telehealth, risks, benefits, and consequences were discussed. Patient retains the right to withdraw at any time. All existing confidentiality protections apply. The patient has access to all transmitted medical information. No dissemination of any patient images or information to other entities without further written consent.  Subjective:       71-year-old gentleman with metastatic adenocarcinoma of the lung currently on Opdivo and Yervoy status post stereotactic radiotherapy complete 10/22/2020.  Today's video visit is to review the results of the most recent MRI scan of the brain. Most recent MRI scan of the brain 3/29/2022 shows a punctate 2 mm size enhancing lesion in the right posterior frontal gray matter likely representing new metastasis.  This is new since the previous study.  There is stable enhancing lesions in the left parietal occipital and right temporal occipital lobes.  There is been significant interval change there are no new lesions.  Differential diagnosis includes radiation necrosis and residual lesion.Currently patient is doing extremely well he has really no new complaints.  He is tolerating his immunotherapy well he did switch from Dr. Koo to Dr. Ruano in [place]..    ROS Denies any major symptoms.  He is followed by Dr. Oscar Crystal for hypothyroidism.  Denies any recent fevers or chills. No nausea or vomiting. No chest pains or shortness of breath.     No Known Allergies    Current medicines (including changes today)  Current Outpatient Medications   Medication Sig Dispense Refill   • tizanidine (ZANAFLEX) 2 MG tablet       • hydrocortisone (CORTEF) 10 MG Tab 1 TABLET TAKING 3 X DAILY ORALLY 30 DAY(S)     • SYNTHROID 150 MCG Tab 1 TABLET ONCE A DAY         DISPENSE AS WRITTEN ORALLY 30 DAY(S)     • hydrOXYzine HCl (ATARAX) 25 MG Tab      • triamcinolone acetonide (KENALOG) 0.1 % Cream Apply  topically 2 times a day.     • Nivolumab (OPDIVO IV) Infuse  into a venous catheter.     • Ipilimumab (YERVOY IV) Infuse  into a venous catheter.     • tamsulosin (FLOMAX) 0.4 MG capsule Take 0.4 mg by mouth every day.     • dexamethasone (DECADRON) 2 MG tablet Take 2 mg by mouth every day. (Patient not taking: Reported on 5/17/2021)     • dexamethasone (DECADRON) 4 MG Tab Take 1 Tab by mouth every 8 hours. 45 Tab 0   • levETIRAcetam (KEPPRA) 500 MG Tab Take 1 Tab by mouth 2 Times a Day. 60 Tab 1   • gabapentin (NEURONTIN) 300 MG Cap Take 300 mg by mouth 4 times a day.     • oxyCODONE immediate-release (ROXICODONE) 5 MG Tab Take 10 mg by mouth every 3 hours as needed for Severe Pain.     • lisinopril (PRINIVIL) 10 MG Tab Take 10 mg by mouth 2 times a day. (Patient not taking: Reported on 5/17/2021)       No current facility-administered medications for this encounter.       Patient Active Problem List    Diagnosis Date Noted   • Mass of left lung 09/23/2020   • Brain metastases (HCC) 09/22/2020   • Tobacco abuse 09/22/2020   • Alcohol abuse 09/22/2020   • Leukocytosis 09/22/2020   • Hyperglycemia 09/22/2020   • Hyponatremia 09/22/2020       History reviewed. No pertinent family history.    He  has no past medical history on file.  He  has no past surgical history on file.       Objective:   Vitals obtained by patient:  There were no vitals taken for this visit.    Physical Exam:Alert oriented x3 in no apparent distress  Constitutional: Alert, no distress, well-groomed.  Skin: No rashes in visible areas.  Eye: Round. Conjunctiva clear, lids normal. No icterus.   ENMT: Lips pink without lesions, good dentition, moist mucous membranes. Phonation  normal.  Neck: No masses, no thyromegaly. Moves freely without pain.  CV: Pulse as reported by patient  Respiratory: Unlabored respiratory effort, no cough or audible wheeze  Psych: Alert and oriented x3, normal affect and mood.       Assessment and Plan:   The following treatment plan was discussed:     Metastatic adenocarcinoma of the lung to the brain.    Follow-up: Because there is a new lesion we are going to have to treat that with SRS.  I will have my office call him to schedule a simulation to get started soon thereafter.  We discussed that this is such a small lesion will be just 1 stereotactic radiosurgery treatment.Risk and benefits have been discussed and patient agrees with plan.    Face to Face Video Visit:   I spent 15 minutes with patient/guardian and I conducted this visit with audio and video present.  Allegra Crystal M.D.

## 2022-04-01 NOTE — CT SIMULATION
PATIENT NAME Juan M Westbrook Hinkle   PRIMARY PHYSICIAN JamesOmkar [id number]   REFERRING PHYSICIAN Trudy Murphy A.P* 1950     No matching staging information was found for the patient.       Treatment Planning CT Simulation      Order Questions     Question Answer    Is this for a new course of treatment? Yes    Is this an Addendum? No    Simulation Status Initial    Treatment Technique SRS    Treatment Pattern/Frequency Daily    Concurrent Chemotherapy No    CT Technique 3D    Slice Thickness 1mm    Scan Extent Brain    Treatment Device(s) SRS Mask    Patient Attire Gown    Patient Position Supine    Patient Orientation Head First    Treatment Machine No preference    Treatment Image Guidance CBCT    Image Guidance Match Bone    Treatment Planning Image Fusion CT/MR    Special Physics Consult Stereotactic    Other Orders Special Tx Procedure

## 2022-04-04 ENCOUNTER — HOSPITAL ENCOUNTER (OUTPATIENT)
Dept: RADIATION ONCOLOGY | Facility: MEDICAL CENTER | Age: 72
End: 2022-04-04

## 2022-04-04 PROCEDURE — 77263 THER RADIOLOGY TX PLNG CPLX: CPT | Performed by: RADIOLOGY

## 2022-04-04 PROCEDURE — 77334 RADIATION TREATMENT AID(S): CPT | Mod: 26 | Performed by: RADIOLOGY

## 2022-04-04 PROCEDURE — 77470 SPECIAL RADIATION TREATMENT: CPT | Mod: 26 | Performed by: RADIOLOGY

## 2022-04-04 PROCEDURE — 77470 SPECIAL RADIATION TREATMENT: CPT | Performed by: RADIOLOGY

## 2022-04-04 PROCEDURE — 77334 RADIATION TREATMENT AID(S): CPT | Performed by: RADIOLOGY

## 2022-04-04 PROCEDURE — 77290 THER RAD SIMULAJ FIELD CPLX: CPT | Mod: 26 | Performed by: RADIOLOGY

## 2022-04-04 PROCEDURE — 77290 THER RAD SIMULAJ FIELD CPLX: CPT | Performed by: RADIOLOGY

## 2022-04-07 PROCEDURE — 77370 RADIATION PHYSICS CONSULT: CPT | Performed by: RADIOLOGY

## 2022-04-08 PROCEDURE — 77300 RADIATION THERAPY DOSE PLAN: CPT | Mod: 26 | Performed by: RADIOLOGY

## 2022-04-08 PROCEDURE — 77334 RADIATION TREATMENT AID(S): CPT | Performed by: RADIOLOGY

## 2022-04-08 PROCEDURE — 77334 RADIATION TREATMENT AID(S): CPT | Mod: 26 | Performed by: RADIOLOGY

## 2022-04-08 PROCEDURE — 77295 3-D RADIOTHERAPY PLAN: CPT | Performed by: RADIOLOGY

## 2022-04-08 PROCEDURE — 77300 RADIATION THERAPY DOSE PLAN: CPT | Performed by: RADIOLOGY

## 2022-04-08 PROCEDURE — 77295 3-D RADIOTHERAPY PLAN: CPT | Mod: 26 | Performed by: RADIOLOGY

## 2022-04-11 ENCOUNTER — HOSPITAL ENCOUNTER (OUTPATIENT)
Dept: RADIATION ONCOLOGY | Facility: MEDICAL CENTER | Age: 72
End: 2022-04-11
Payer: COMMERCIAL

## 2022-04-11 DIAGNOSIS — C79.31 BRAIN METASTASES: ICD-10-CM

## 2022-04-11 DIAGNOSIS — E23.0 HYPOPITUITARISM (HCC): ICD-10-CM

## 2022-04-11 LAB
CHEMOTHERAPY INFUSION START DATE: NORMAL
CHEMOTHERAPY RECORDS: 20
CHEMOTHERAPY RECORDS: 2000
CHEMOTHERAPY RECORDS: NORMAL
CHEMOTHERAPY RX CANCER: NORMAL
DATE 1ST CHEMO CANCER: NORMAL
RAD ONC ARIA COURSE LAST TREATMENT DATE: NORMAL
RAD ONC ARIA COURSE TREATMENT ELAPSED DAYS: NORMAL
RAD ONC ARIA REFERENCE POINT DOSAGE GIVEN TO DATE: 20
RAD ONC ARIA REFERENCE POINT DOSAGE GIVEN TO DATE: 24.92
RAD ONC ARIA REFERENCE POINT ID: NORMAL
RAD ONC ARIA REFERENCE POINT ID: NORMAL
RAD ONC ARIA REFERENCE POINT SESSION DOSAGE GIVEN: 20
RAD ONC ARIA REFERENCE POINT SESSION DOSAGE GIVEN: 24.92

## 2022-04-11 PROCEDURE — 77372 SRS LINEAR BASED: CPT | Performed by: RADIOLOGY

## 2022-04-11 PROCEDURE — 77432 STEREOTACTIC RADIATION TRMT: CPT | Performed by: RADIOLOGY

## 2022-04-11 PROCEDURE — 77280 THER RAD SIMULAJ FIELD SMPL: CPT | Mod: 26 | Performed by: RADIOLOGY

## 2022-04-11 PROCEDURE — 77336 RADIATION PHYSICS CONSULT: CPT | Performed by: RADIOLOGY

## 2022-04-11 PROCEDURE — 77280 THER RAD SIMULAJ FIELD SMPL: CPT | Performed by: RADIOLOGY

## 2022-04-11 NOTE — PROGRESS NOTES
NEUROSURGERY STEREOTACTIC PROCEDURE NOTE    Patient name:  Juan M Hinkle    Primary Physician:  Omkar Ramirez M.D. MRN: [id number]  CSN: [id number]   Referring physician:  No ref. provider found : 1950, 71 y.o.     ENCOUNTER DATE:  2022    DIAGNOSIS:  No matching staging information was found for the patient.    PROCEDURE:   SAM-BEAM STEREOTACTIC RADIOTHERAPY    CLASSIFICATION:  [x]SIMPLE  []COMPLEX    ISOCENTERS:  [x]1 []2 []3+    TREATMENT SUMMARY:  [facility] Treatment Information        Some values may be hidden. Unless noted otherwise, only the newest values recorded on each date are displayed.         Aria Treatment Summary 22   Course First Treatment Date 2022   Course Last Treatment Date 2022   Brain_SRS Plan from Course C2_L_post-fronta   Fraction 1 of 1   Elapsed Course Days 0 @ [id number]   Prescribed Fraction Dose 2,000 cGy   Prescribed Total Dose 2,000 cGy   R_PostFronSRS Reference Point from Course C2_L_post-fronta   Elapsed Course Days 0 @ [id number]   Session Dose 2,000 cGy   Total Dose 2,000 cGy   R_PostFronSRS Cp Reference Point from Course C2_L_post-fronta   Elapsed Course Days 0 @ [id number]   Session Dose 2,492 cGy   Total Dose 2,492 cGy   02,03 SRT Plan from Course C1_3 Lesion SRT   02,03 SRT TB3 Plan from Course C1_3 Lesion SRT   L Frontal SRS Plan from Course C1_3 Lesion SRT   02,03 SRT Reference Point from Course C1_3 Lesion SRT   02,03 SRT CP Reference Point from Course C1_3 Lesion SRT   L Frontal SRS Reference Point from Course C1_3 Lesion SRT   L Frontal SRS CP Reference Point from Course C1_3 Lesion SRT   02,03 SRT Plan from Course C1QA   L Frontal SRS Plan from Course C1QA   10x Calc Pt Reference Point from Course C1QA   10x Calc Pt1 Reference Point from Course C1QA   10x FFF Reference Point from Course C1QA   10x FFF1 Reference Point from Course C1QA   15x Reference Point from Course C1QA   15x1 Reference Point from Course C1QA   6x Calc Pt  Reference Point from Course C1QA   6x Calc Pt1 Reference Point from Course C1QA   6x FFF Reference Point from Course C1QA   6x FFF1 Reference Point from Course C1QA   Exit Pt Reference Point from Course C1QA   Exit Pt1 Reference Point from Course C1QA   Verification Reference Point from Course C1QA   Verification1 Reference Point from Course C1QA   02,03 SRT Plan from Course Planning C1   L Frontal SRS Plan from Course Planning C1   02,03 SRT Reference Point from Course Planning C1   02,03 SRT CP Reference Point from Course Planning C1   L Frontal SRS Reference Point from Course Planning C1   L Frontal SRS CP Reference Point from Course Planning C1                After discussion of risks, benefits, and alternatives, the patient elected to proceed with stereotactic radiosurgery. The patient underwent a planning session consisting of radiosurgery protocol MRI, as well as a CT scan in their mask. The image data sets were fused for treatment planning. The lesion was contoured, critical structures were contoured as well, including brain stem, optic nerves, optic chiasm, globes, etc. A plan was then created with the goal of optimizing radiation to the lesion and minimizing radiation to critical structures. Once a plan was approved by all involved parties, the patient was brought to the treatment room, attached to the table, and aligned. When the alignment was confirmed, the patient proceeded with treatment.

## 2022-04-12 LAB
CHEMOTHERAPY INFUSION START DATE: NORMAL
CHEMOTHERAPY INFUSION STOP DATE: NORMAL
CHEMOTHERAPY RECORDS: 20
CHEMOTHERAPY RECORDS: 2000
CHEMOTHERAPY RECORDS: NORMAL
CHEMOTHERAPY RX CANCER: NORMAL
DATE 1ST CHEMO CANCER: NORMAL
RAD ONC ARIA COURSE LAST TREATMENT DATE: NORMAL
RAD ONC ARIA COURSE TREATMENT ELAPSED DAYS: NORMAL
RAD ONC ARIA REFERENCE POINT DOSAGE GIVEN TO DATE: 20
RAD ONC ARIA REFERENCE POINT DOSAGE GIVEN TO DATE: 24.92
RAD ONC ARIA REFERENCE POINT ID: NORMAL
RAD ONC ARIA REFERENCE POINT ID: NORMAL

## 2022-07-13 ENCOUNTER — HOSPITAL ENCOUNTER (OUTPATIENT)
Dept: RADIOLOGY | Facility: MEDICAL CENTER | Age: 72
End: 2022-07-13
Payer: COMMERCIAL

## 2022-07-18 ENCOUNTER — HOSPITAL ENCOUNTER (OUTPATIENT)
Dept: RADIATION ONCOLOGY | Facility: MEDICAL CENTER | Age: 72
End: 2022-07-31
Attending: RADIOLOGY
Payer: COMMERCIAL

## 2022-07-18 DIAGNOSIS — C34.12 MALIGNANT NEOPLASM OF UPPER LOBE OF LEFT LUNG (HCC): ICD-10-CM

## 2022-07-18 DIAGNOSIS — C79.31 BRAIN METASTASES: ICD-10-CM

## 2022-07-18 PROBLEM — C34.90 LUNG CANCER (HCC): Status: ACTIVE | Noted: 2022-07-18

## 2022-07-18 PROCEDURE — 99214 OFFICE O/P EST MOD 30 MIN: CPT | Mod: 95 | Performed by: RADIOLOGY

## 2022-07-18 NOTE — PROGRESS NOTES
Telemedicine Video Visit: Established Patient   This Remote Face to Face encounter was conducted via Zoom. Given the importance of social distancing and other strategies recommended to reduce the risk of COVID-19 transmission, I am providing medical care to this patient via audio/video visit in place of an in person visit at the request of the patient. Verbal consent to telehealth, risks, benefits, and consequences were discussed. Patient retains the right to withdraw at any time. All existing confidentiality protections apply. The patient has access to all transmitted medical information. No dissemination of any patient images or information to other entities without further written consent.  Subjective:   Metastatic lung cancer to brain status post SRS.  Here to review results of recent MRI scan of the brain.    72-year-old gentleman presenting with metastatic adenocarcinoma of the lung in 2020 is status post stereotactic radiosurgery complete 10/22/2020. He then was found on a scan 3/29/2022 to have 2 small punctate enhancing lesions in the right posterior frontal gray matter likely representing new metastasis.  This was treated with SRS complete 4/11/2022.  He is here to review the most recent MRI scan. MRI scan shows a metastatic lesion in the left parietal lobe measuring 1.6 cm and 1 in the right occipital measuring 1.4 cm.This was fused with her treatment planning CT and both of these areas have been treated previously.He complains of migraines 7 out of 10 but he has long-acting OxyContin to take as needed for his back and he has been doing that.  The pain goes away within a few minutes after he wakes up.He sees Dr. Oscar Ruano for his medical oncology therapy and is continuing on IPI/NIVO.  Most recent CT scan which I do not have available shows continued response.    ROS Headaches and a little bit of fogginess in the memory over the last week.  Denies any recent fevers or chills. No nausea or vomiting. No  chest pains or shortness of breath.     No Known Allergies    Current medicines (including changes today)  Current Outpatient Medications   Medication Sig Dispense Refill   • tizanidine (ZANAFLEX) 2 MG tablet      • hydrocortisone (CORTEF) 10 MG Tab 1 TABLET TAKING 3 X DAILY ORALLY 30 DAY(S)     • SYNTHROID 150 MCG Tab 1 TABLET ONCE A DAY         DISPENSE AS WRITTEN ORALLY 30 DAY(S)     • hydrOXYzine HCl (ATARAX) 25 MG Tab      • triamcinolone acetonide (KENALOG) 0.1 % Cream Apply  topically 2 times a day.     • Nivolumab (OPDIVO IV) Infuse  into a venous catheter.     • Ipilimumab (YERVOY IV) Infuse  into a venous catheter.     • tamsulosin (FLOMAX) 0.4 MG capsule Take 0.4 mg by mouth every day.     • dexamethasone (DECADRON) 2 MG tablet Take 2 mg by mouth every day. (Patient not taking: Reported on 5/17/2021)     • dexamethasone (DECADRON) 4 MG Tab Take 1 Tab by mouth every 8 hours. 45 Tab 0   • levETIRAcetam (KEPPRA) 500 MG Tab Take 1 Tab by mouth 2 Times a Day. 60 Tab 1   • gabapentin (NEURONTIN) 300 MG Cap Take 300 mg by mouth 4 times a day.     • oxyCODONE immediate-release (ROXICODONE) 5 MG Tab Take 10 mg by mouth every 3 hours as needed for Severe Pain.     • lisinopril (PRINIVIL) 10 MG Tab Take 10 mg by mouth 2 times a day. (Patient not taking: Reported on 5/17/2021)       No current facility-administered medications for this encounter.       Patient Active Problem List    Diagnosis Date Noted   • Mass of left lung 09/23/2020   • Brain metastases (HCC) 09/22/2020   • Tobacco abuse 09/22/2020   • Alcohol abuse 09/22/2020   • Leukocytosis 09/22/2020   • Hyperglycemia 09/22/2020   • Hyponatremia 09/22/2020       History reviewed. No pertinent family history.    He  has no past medical history on file.  He  has no past surgical history on file.       Objective:   Vitals obtained by patient:  There were no vitals taken for this visit.    Physical Exam:Alert oriented x3 somewhat confused  Constitutional: Alert,  no distress, well-groomed.  Skin: No rashes in visible areas.  Eye: Round. Conjunctiva clear, lids normal. No icterus.   ENMT: Lips pink without lesions, good dentition, moist mucous membranes. Phonation normal.  Neck: No masses, no thyromegaly. Moves freely without pain.  CV: Pulse as reported by patient  Respiratory: Unlabored respiratory effort, no cough or audible wheeze  Psych: Alert and oriented x3, normal affect and mood.       Assessment and Plan:   The following treatment plan was discussed:     Metastatic lung cancer to brain    Follow-up: I will see patient in follow-up in 3 months with a repeat MRI scan at that time.  I like to see him in [facility] [place] [facility] with .  I would also like the MRI scan to be done here so that is compared with the prior images.    Face to Face Video Visit:   I spent 25 minutes with patient/guardian and I conducted this visit with audio and video present.  Allegra Crystal M.D.

## 2022-07-18 NOTE — ADDENDUM NOTE
Encounter addended by: Allegra Crystal M.D. on: 7/18/2022 1:51 PM   Actions taken: Order list changed, Diagnosis association updated

## 2022-10-24 ENCOUNTER — APPOINTMENT (OUTPATIENT)
Dept: RADIATION ONCOLOGY | Facility: MEDICAL CENTER | Age: 72
End: 2022-10-24
Attending: RADIOLOGY
Payer: COMMERCIAL